# Patient Record
Sex: FEMALE | Race: WHITE | NOT HISPANIC OR LATINO | Employment: FULL TIME | URBAN - NONMETROPOLITAN AREA
[De-identification: names, ages, dates, MRNs, and addresses within clinical notes are randomized per-mention and may not be internally consistent; named-entity substitution may affect disease eponyms.]

---

## 2017-06-08 ENCOUNTER — APPOINTMENT (OUTPATIENT)
Dept: ULTRASOUND IMAGING | Facility: HOSPITAL | Age: 27
End: 2017-06-08

## 2017-06-08 ENCOUNTER — ANESTHESIA (OUTPATIENT)
Dept: PERIOP | Facility: HOSPITAL | Age: 27
End: 2017-06-08

## 2017-06-08 ENCOUNTER — HOSPITAL ENCOUNTER (OUTPATIENT)
Facility: HOSPITAL | Age: 27
Discharge: HOME | End: 2017-06-08
Attending: EMERGENCY MEDICINE | Admitting: SURGERY

## 2017-06-08 ENCOUNTER — APPOINTMENT (OUTPATIENT)
Dept: MRI IMAGING | Facility: HOSPITAL | Age: 27
End: 2017-06-08

## 2017-06-08 ENCOUNTER — ANESTHESIA EVENT (OUTPATIENT)
Dept: PERIOP | Facility: HOSPITAL | Age: 27
End: 2017-06-08

## 2017-06-08 VITALS
BODY MASS INDEX: 20.9 KG/M2 | OXYGEN SATURATION: 96 % | HEART RATE: 81 BPM | SYSTOLIC BLOOD PRESSURE: 111 MMHG | WEIGHT: 117.95 LBS | HEIGHT: 63 IN | TEMPERATURE: 98.2 F | RESPIRATION RATE: 20 BRPM | DIASTOLIC BLOOD PRESSURE: 58 MMHG

## 2017-06-08 DIAGNOSIS — Z3A.01 LESS THAN 8 WEEKS GESTATION OF PREGNANCY: ICD-10-CM

## 2017-06-08 DIAGNOSIS — O00.109 TUBAL PREGNANCY WITHOUT INTRAUTERINE PREGNANCY: ICD-10-CM

## 2017-06-08 DIAGNOSIS — G89.18 POSTOPERATIVE PAIN: ICD-10-CM

## 2017-06-08 DIAGNOSIS — K35.30 ACUTE APPENDICITIS WITH LOCALIZED PERITONITIS: Primary | ICD-10-CM

## 2017-06-08 LAB
ABO GROUP BLD: NORMAL
ALBUMIN SERPL-MCNC: 4.5 G/DL (ref 3.4–4.8)
ALBUMIN/GLOB SERPL: 1.7 G/DL (ref 1.1–1.8)
ALP SERPL-CCNC: 55 U/L (ref 38–126)
ALT SERPL W P-5'-P-CCNC: 25 U/L (ref 9–52)
ANION GAP SERPL CALCULATED.3IONS-SCNC: 12 MMOL/L (ref 5–15)
AST SERPL-CCNC: 23 U/L (ref 14–36)
B-HCG UR QL: POSITIVE
BACTERIA UR QL AUTO: ABNORMAL /HPF
BASOPHILS # BLD AUTO: 0.02 10*3/MM3 (ref 0–0.2)
BASOPHILS NFR BLD AUTO: 0.1 % (ref 0–2)
BILIRUB SERPL-MCNC: 0.4 MG/DL (ref 0.2–1.3)
BILIRUB UR QL STRIP: NEGATIVE
BUN BLD-MCNC: 8 MG/DL (ref 7–21)
BUN/CREAT SERPL: 14.5 (ref 7–25)
CALCIUM SPEC-SCNC: 8.8 MG/DL (ref 8.4–10.2)
CANDIDA ALBICANS: NEGATIVE
CHLORIDE SERPL-SCNC: 103 MMOL/L (ref 95–110)
CLARITY UR: ABNORMAL
CO2 SERPL-SCNC: 24 MMOL/L (ref 22–31)
COLOR UR: YELLOW
CREAT BLD-MCNC: 0.55 MG/DL (ref 0.5–1)
DEPRECATED RDW RBC AUTO: 40.7 FL (ref 36.4–46.3)
EOSINOPHIL # BLD AUTO: 0.14 10*3/MM3 (ref 0–0.7)
EOSINOPHIL NFR BLD AUTO: 1 % (ref 0–7)
ERYTHROCYTE [DISTWIDTH] IN BLOOD BY AUTOMATED COUNT: 12.1 % (ref 11.5–14.5)
GARDNERELLA VAGINALIS: NEGATIVE
GFR SERPL CREATININE-BSD FRML MDRD: 132 ML/MIN/1.73 (ref 60–165)
GLOBULIN UR ELPH-MCNC: 2.6 GM/DL (ref 2.3–3.5)
GLUCOSE BLD-MCNC: 104 MG/DL (ref 60–100)
GLUCOSE UR STRIP-MCNC: NEGATIVE MG/DL
HCG INTACT+B SERPL-ACNC: 469.18 MIU/ML
HCT VFR BLD AUTO: 35.4 % (ref 35–45)
HGB BLD-MCNC: 12.3 G/DL (ref 12–15.5)
HGB UR QL STRIP.AUTO: NEGATIVE
HOLD SPECIMEN: NORMAL
HOLD SPECIMEN: NORMAL
HYALINE CASTS UR QL AUTO: ABNORMAL /LPF
IMM GRANULOCYTES # BLD: 0.03 10*3/MM3 (ref 0–0.02)
IMM GRANULOCYTES NFR BLD: 0.2 % (ref 0–0.5)
KETONES UR QL STRIP: NEGATIVE
LEUKOCYTE ESTERASE UR QL STRIP.AUTO: ABNORMAL
LIPASE SERPL-CCNC: 53 U/L (ref 23–300)
LYMPHOCYTES # BLD AUTO: 2.3 10*3/MM3 (ref 0.6–4.2)
LYMPHOCYTES NFR BLD AUTO: 17.2 % (ref 10–50)
MCH RBC QN AUTO: 31.8 PG (ref 26.5–34)
MCHC RBC AUTO-ENTMCNC: 34.7 G/DL (ref 31.4–36)
MCV RBC AUTO: 91.5 FL (ref 80–98)
MONOCYTES # BLD AUTO: 1.02 10*3/MM3 (ref 0–0.9)
MONOCYTES NFR BLD AUTO: 7.6 % (ref 0–12)
NEUTROPHILS # BLD AUTO: 9.87 10*3/MM3 (ref 2–8.6)
NEUTROPHILS NFR BLD AUTO: 73.9 % (ref 37–80)
NITRITE UR QL STRIP: NEGATIVE
PH UR STRIP.AUTO: 5.5 [PH] (ref 5–9)
PLATELET # BLD AUTO: 222 10*3/MM3 (ref 150–450)
PMV BLD AUTO: 10.6 FL (ref 8–12)
POTASSIUM BLD-SCNC: 3.2 MMOL/L (ref 3.5–5.1)
PROT SERPL-MCNC: 7.1 G/DL (ref 6.3–8.6)
PROT UR QL STRIP: NEGATIVE
RBC # BLD AUTO: 3.87 10*6/MM3 (ref 3.77–5.16)
RBC # UR: ABNORMAL /HPF
REF LAB TEST METHOD: ABNORMAL
RH BLD: POSITIVE
SODIUM BLD-SCNC: 139 MMOL/L (ref 137–145)
SP GR UR STRIP: 1 (ref 1–1.03)
SQUAMOUS #/AREA URNS HPF: ABNORMAL /HPF
TRICHOMONAS VAGINALIS PCR: NEGATIVE
UROBILINOGEN UR QL STRIP: ABNORMAL
WBC NRBC COR # BLD: 13.38 10*3/MM3 (ref 3.2–9.8)
WBC UR QL AUTO: ABNORMAL /HPF
WHOLE BLOOD HOLD SPECIMEN: NORMAL
WHOLE BLOOD HOLD SPECIMEN: NORMAL

## 2017-06-08 PROCEDURE — G0378 HOSPITAL OBSERVATION PER HR: HCPCS

## 2017-06-08 PROCEDURE — 25010000002 MORPHINE PER 10 MG: Performed by: EMERGENCY MEDICINE

## 2017-06-08 PROCEDURE — 87491 CHLMYD TRACH DNA AMP PROBE: CPT | Performed by: EMERGENCY MEDICINE

## 2017-06-08 PROCEDURE — 25010000002 HYDROMORPHONE PER 4 MG: Performed by: NURSE ANESTHETIST, CERTIFIED REGISTERED

## 2017-06-08 PROCEDURE — 93010 ELECTROCARDIOGRAM REPORT: CPT | Performed by: INTERNAL MEDICINE

## 2017-06-08 PROCEDURE — 87510 GARDNER VAG DNA DIR PROBE: CPT | Performed by: EMERGENCY MEDICINE

## 2017-06-08 PROCEDURE — 88302 TISSUE EXAM BY PATHOLOGIST: CPT | Performed by: OBSTETRICS & GYNECOLOGY

## 2017-06-08 PROCEDURE — 80053 COMPREHEN METABOLIC PANEL: CPT | Performed by: EMERGENCY MEDICINE

## 2017-06-08 PROCEDURE — 59151 TREAT ECTOPIC PREGNANCY: CPT | Performed by: PHYSICIAN ASSISTANT

## 2017-06-08 PROCEDURE — 87591 N.GONORRHOEAE DNA AMP PROB: CPT | Performed by: EMERGENCY MEDICINE

## 2017-06-08 PROCEDURE — 25010000002 SUCCINYLCHOLINE PER 20 MG: Performed by: NURSE ANESTHETIST, CERTIFIED REGISTERED

## 2017-06-08 PROCEDURE — 59151 TREAT ECTOPIC PREGNANCY: CPT | Performed by: OBSTETRICS & GYNECOLOGY

## 2017-06-08 PROCEDURE — 88305 TISSUE EXAM BY PATHOLOGIST: CPT | Performed by: OBSTETRICS & GYNECOLOGY

## 2017-06-08 PROCEDURE — 96375 TX/PRO/DX INJ NEW DRUG ADDON: CPT

## 2017-06-08 PROCEDURE — 93976 VASCULAR STUDY: CPT

## 2017-06-08 PROCEDURE — 87086 URINE CULTURE/COLONY COUNT: CPT | Performed by: EMERGENCY MEDICINE

## 2017-06-08 PROCEDURE — 81001 URINALYSIS AUTO W/SCOPE: CPT | Performed by: EMERGENCY MEDICINE

## 2017-06-08 PROCEDURE — 93005 ELECTROCARDIOGRAM TRACING: CPT | Performed by: SURGERY

## 2017-06-08 PROCEDURE — 84702 CHORIONIC GONADOTROPIN TEST: CPT | Performed by: EMERGENCY MEDICINE

## 2017-06-08 PROCEDURE — 25010000002 DEXAMETHASONE PER 1 MG: Performed by: NURSE ANESTHETIST, CERTIFIED REGISTERED

## 2017-06-08 PROCEDURE — 25010000002 PROPOFOL 10 MG/ML EMULSION: Performed by: NURSE ANESTHETIST, CERTIFIED REGISTERED

## 2017-06-08 PROCEDURE — 25010000002 ONDANSETRON PER 1 MG: Performed by: NURSE ANESTHETIST, CERTIFIED REGISTERED

## 2017-06-08 PROCEDURE — 25010000002 NEOSTIGMINE PER 0.5 MG: Performed by: NURSE ANESTHETIST, CERTIFIED REGISTERED

## 2017-06-08 PROCEDURE — 25010000002 FENTANYL CITRATE (PF) 100 MCG/2ML SOLUTION: Performed by: NURSE ANESTHETIST, CERTIFIED REGISTERED

## 2017-06-08 PROCEDURE — 25010000002 LEVOFLOXACIN PER 250 MG: Performed by: EMERGENCY MEDICINE

## 2017-06-08 PROCEDURE — 87480 CANDIDA DNA DIR PROBE: CPT | Performed by: EMERGENCY MEDICINE

## 2017-06-08 PROCEDURE — 96366 THER/PROPH/DIAG IV INF ADDON: CPT

## 2017-06-08 PROCEDURE — 86900 BLOOD TYPING SEROLOGIC ABO: CPT | Performed by: EMERGENCY MEDICINE

## 2017-06-08 PROCEDURE — 25010000002 ONDANSETRON PER 1 MG: Performed by: OBSTETRICS & GYNECOLOGY

## 2017-06-08 PROCEDURE — 25010000002 DIPHENHYDRAMINE PER 50 MG: Performed by: EMERGENCY MEDICINE

## 2017-06-08 PROCEDURE — 81025 URINE PREGNANCY TEST: CPT | Performed by: EMERGENCY MEDICINE

## 2017-06-08 PROCEDURE — 85025 COMPLETE CBC W/AUTO DIFF WBC: CPT | Performed by: EMERGENCY MEDICINE

## 2017-06-08 PROCEDURE — 96361 HYDRATE IV INFUSION ADD-ON: CPT

## 2017-06-08 PROCEDURE — 76801 OB US < 14 WKS SINGLE FETUS: CPT

## 2017-06-08 PROCEDURE — 99285 EMERGENCY DEPT VISIT HI MDM: CPT

## 2017-06-08 PROCEDURE — 74181 MRI ABDOMEN W/O CONTRAST: CPT

## 2017-06-08 PROCEDURE — 36415 COLL VENOUS BLD VENIPUNCTURE: CPT

## 2017-06-08 PROCEDURE — 83690 ASSAY OF LIPASE: CPT | Performed by: EMERGENCY MEDICINE

## 2017-06-08 PROCEDURE — 25010000002 ONDANSETRON PER 1 MG: Performed by: EMERGENCY MEDICINE

## 2017-06-08 PROCEDURE — 96365 THER/PROPH/DIAG IV INF INIT: CPT

## 2017-06-08 PROCEDURE — 25010000002 KETOROLAC TROMETHAMINE PER 15 MG: Performed by: OBSTETRICS & GYNECOLOGY

## 2017-06-08 PROCEDURE — 86901 BLOOD TYPING SEROLOGIC RH(D): CPT | Performed by: EMERGENCY MEDICINE

## 2017-06-08 PROCEDURE — 72195 MRI PELVIS W/O DYE: CPT

## 2017-06-08 PROCEDURE — 87660 TRICHOMONAS VAGIN DIR PROBE: CPT | Performed by: EMERGENCY MEDICINE

## 2017-06-08 PROCEDURE — 96367 TX/PROPH/DG ADDL SEQ IV INF: CPT

## 2017-06-08 PROCEDURE — 49320 DIAG LAPARO SEPARATE PROC: CPT | Performed by: SURGERY

## 2017-06-08 DEVICE — CLIP FALLOP FILSHIE TI PR STRL BX/20: Type: IMPLANTABLE DEVICE | Status: FUNCTIONAL

## 2017-06-08 RX ORDER — DOCUSATE SODIUM 100 MG/1
100 CAPSULE, LIQUID FILLED ORAL ONCE
Status: DISCONTINUED | OUTPATIENT
Start: 2017-06-08 | End: 2017-06-08 | Stop reason: HOSPADM

## 2017-06-08 RX ORDER — SENNA PLUS 8.6 MG/1
1 TABLET ORAL ONCE
Status: DISCONTINUED | OUTPATIENT
Start: 2017-06-08 | End: 2017-06-08 | Stop reason: HOSPADM

## 2017-06-08 RX ORDER — FLUMAZENIL 0.1 MG/ML
0.2 INJECTION INTRAVENOUS AS NEEDED
Status: DISCONTINUED | OUTPATIENT
Start: 2017-06-08 | End: 2017-06-08 | Stop reason: HOSPADM

## 2017-06-08 RX ORDER — PROMETHAZINE HYDROCHLORIDE 25 MG/1
25 TABLET ORAL EVERY 6 HOURS PRN
Qty: 60 TABLET | Refills: 11 | Status: SHIPPED | OUTPATIENT
Start: 2017-06-08 | End: 2018-04-30

## 2017-06-08 RX ORDER — IBUPROFEN 800 MG/1
800 TABLET ORAL EVERY 8 HOURS PRN
Qty: 60 TABLET | Refills: 12 | Status: SHIPPED | OUTPATIENT
Start: 2017-06-08 | End: 2017-06-15

## 2017-06-08 RX ORDER — ONDANSETRON 8 MG/1
8 TABLET, ORALLY DISINTEGRATING ORAL DAILY
Qty: 30 TABLET | Refills: 11 | Status: SHIPPED | OUTPATIENT
Start: 2017-06-08 | End: 2018-04-30

## 2017-06-08 RX ORDER — LEVOFLOXACIN 5 MG/ML
750 INJECTION, SOLUTION INTRAVENOUS ONCE
Status: COMPLETED | OUTPATIENT
Start: 2017-06-08 | End: 2017-06-08

## 2017-06-08 RX ORDER — SODIUM CHLORIDE 0.9 % (FLUSH) 0.9 %
10 SYRINGE (ML) INJECTION AS NEEDED
Status: DISCONTINUED | OUTPATIENT
Start: 2017-06-08 | End: 2017-06-08 | Stop reason: HOSPADM

## 2017-06-08 RX ORDER — ONDANSETRON 2 MG/ML
4 INJECTION INTRAMUSCULAR; INTRAVENOUS ONCE AS NEEDED
Status: COMPLETED | OUTPATIENT
Start: 2017-06-08 | End: 2017-06-08

## 2017-06-08 RX ORDER — PROMETHAZINE HYDROCHLORIDE 25 MG/ML
12.5 INJECTION, SOLUTION INTRAMUSCULAR; INTRAVENOUS ONCE AS NEEDED
Status: DISCONTINUED | OUTPATIENT
Start: 2017-06-08 | End: 2017-06-08 | Stop reason: HOSPADM

## 2017-06-08 RX ORDER — DIPHENHYDRAMINE HYDROCHLORIDE 50 MG/ML
25 INJECTION INTRAMUSCULAR; INTRAVENOUS ONCE
Status: COMPLETED | OUTPATIENT
Start: 2017-06-08 | End: 2017-06-08

## 2017-06-08 RX ORDER — DEXAMETHASONE SODIUM PHOSPHATE 4 MG/ML
INJECTION, SOLUTION INTRA-ARTICULAR; INTRALESIONAL; INTRAMUSCULAR; INTRAVENOUS; SOFT TISSUE AS NEEDED
Status: DISCONTINUED | OUTPATIENT
Start: 2017-06-08 | End: 2017-06-08 | Stop reason: SURG

## 2017-06-08 RX ORDER — SODIUM CHLORIDE, SODIUM GLUCONATE, SODIUM ACETATE, POTASSIUM CHLORIDE, AND MAGNESIUM CHLORIDE 526; 502; 368; 37; 30 MG/100ML; MG/100ML; MG/100ML; MG/100ML; MG/100ML
INJECTION, SOLUTION INTRAVENOUS CONTINUOUS PRN
Status: DISCONTINUED | OUTPATIENT
Start: 2017-06-08 | End: 2017-06-08 | Stop reason: SURG

## 2017-06-08 RX ORDER — PROMETHAZINE HYDROCHLORIDE 12.5 MG/1
12.5 TABLET ORAL ONCE AS NEEDED
Status: DISCONTINUED | OUTPATIENT
Start: 2017-06-08 | End: 2017-06-08 | Stop reason: HOSPADM

## 2017-06-08 RX ORDER — DIPHENHYDRAMINE HYDROCHLORIDE 50 MG/ML
12.5 INJECTION INTRAMUSCULAR; INTRAVENOUS
Status: DISCONTINUED | OUTPATIENT
Start: 2017-06-08 | End: 2017-06-08 | Stop reason: HOSPADM

## 2017-06-08 RX ORDER — KETOROLAC TROMETHAMINE 15 MG/ML
60 INJECTION, SOLUTION INTRAMUSCULAR; INTRAVENOUS ONCE
Status: DISCONTINUED | OUTPATIENT
Start: 2017-06-08 | End: 2017-06-08

## 2017-06-08 RX ORDER — LIDOCAINE HYDROCHLORIDE 20 MG/ML
INJECTION, SOLUTION INFILTRATION; PERINEURAL AS NEEDED
Status: DISCONTINUED | OUTPATIENT
Start: 2017-06-08 | End: 2017-06-08 | Stop reason: SURG

## 2017-06-08 RX ORDER — HYDROCODONE BITARTRATE AND ACETAMINOPHEN 5; 325 MG/1; MG/1
1 TABLET ORAL EVERY 6 HOURS PRN
Qty: 40 TABLET | Refills: 0 | Status: SHIPPED | OUTPATIENT
Start: 2017-06-08 | End: 2018-04-30

## 2017-06-08 RX ORDER — SUCCINYLCHOLINE CHLORIDE 20 MG/ML
INJECTION INTRAMUSCULAR; INTRAVENOUS AS NEEDED
Status: DISCONTINUED | OUTPATIENT
Start: 2017-06-08 | End: 2017-06-08 | Stop reason: SURG

## 2017-06-08 RX ORDER — ACETAMINOPHEN 325 MG/1
650 TABLET ORAL ONCE
Status: DISCONTINUED | OUTPATIENT
Start: 2017-06-08 | End: 2017-06-08 | Stop reason: HOSPADM

## 2017-06-08 RX ORDER — IBUPROFEN 800 MG/1
800 TABLET ORAL EVERY 8 HOURS PRN
Qty: 60 TABLET | Refills: 12 | Status: SHIPPED | OUTPATIENT
Start: 2017-06-08 | End: 2017-10-16

## 2017-06-08 RX ORDER — ONDANSETRON 2 MG/ML
4 INJECTION INTRAMUSCULAR; INTRAVENOUS ONCE
Status: COMPLETED | OUTPATIENT
Start: 2017-06-08 | End: 2017-06-08

## 2017-06-08 RX ORDER — FENTANYL CITRATE 50 UG/ML
INJECTION, SOLUTION INTRAMUSCULAR; INTRAVENOUS AS NEEDED
Status: DISCONTINUED | OUTPATIENT
Start: 2017-06-08 | End: 2017-06-08 | Stop reason: SURG

## 2017-06-08 RX ORDER — ROCURONIUM BROMIDE 10 MG/ML
INJECTION, SOLUTION INTRAVENOUS AS NEEDED
Status: DISCONTINUED | OUTPATIENT
Start: 2017-06-08 | End: 2017-06-08 | Stop reason: SURG

## 2017-06-08 RX ORDER — NALOXONE HCL 0.4 MG/ML
0.2 VIAL (ML) INJECTION AS NEEDED
Status: DISCONTINUED | OUTPATIENT
Start: 2017-06-08 | End: 2017-06-08 | Stop reason: HOSPADM

## 2017-06-08 RX ORDER — ONDANSETRON 4 MG/1
4 TABLET, FILM COATED ORAL ONCE AS NEEDED
Status: DISCONTINUED | OUTPATIENT
Start: 2017-06-08 | End: 2017-06-08 | Stop reason: HOSPADM

## 2017-06-08 RX ORDER — HYDROCODONE BITARTRATE AND ACETAMINOPHEN 7.5; 325 MG/1; MG/1
1 TABLET ORAL ONCE AS NEEDED
Status: DISCONTINUED | OUTPATIENT
Start: 2017-06-08 | End: 2017-06-08 | Stop reason: HOSPADM

## 2017-06-08 RX ORDER — ONDANSETRON 2 MG/ML
INJECTION INTRAMUSCULAR; INTRAVENOUS AS NEEDED
Status: DISCONTINUED | OUTPATIENT
Start: 2017-06-08 | End: 2017-06-08 | Stop reason: SURG

## 2017-06-08 RX ORDER — KETOROLAC TROMETHAMINE 30 MG/ML
60 INJECTION, SOLUTION INTRAMUSCULAR; INTRAVENOUS ONCE
Status: COMPLETED | OUTPATIENT
Start: 2017-06-08 | End: 2017-06-08

## 2017-06-08 RX ORDER — SODIUM CHLORIDE 9 MG/ML
125 INJECTION, SOLUTION INTRAVENOUS CONTINUOUS
Status: DISCONTINUED | OUTPATIENT
Start: 2017-06-08 | End: 2017-06-08 | Stop reason: HOSPADM

## 2017-06-08 RX ORDER — ACETAMINOPHEN 325 MG/1
650 TABLET ORAL ONCE AS NEEDED
Status: DISCONTINUED | OUTPATIENT
Start: 2017-06-08 | End: 2017-06-08 | Stop reason: HOSPADM

## 2017-06-08 RX ORDER — GLYCOPYRROLATE 0.2 MG/ML
INJECTION INTRAMUSCULAR; INTRAVENOUS AS NEEDED
Status: DISCONTINUED | OUTPATIENT
Start: 2017-06-08 | End: 2017-06-08 | Stop reason: SURG

## 2017-06-08 RX ORDER — PROPOFOL 10 MG/ML
VIAL (ML) INTRAVENOUS AS NEEDED
Status: DISCONTINUED | OUTPATIENT
Start: 2017-06-08 | End: 2017-06-08 | Stop reason: SURG

## 2017-06-08 RX ORDER — LABETALOL HYDROCHLORIDE 5 MG/ML
5 INJECTION, SOLUTION INTRAVENOUS
Status: DISCONTINUED | OUTPATIENT
Start: 2017-06-08 | End: 2017-06-08 | Stop reason: HOSPADM

## 2017-06-08 RX ORDER — ACETAMINOPHEN 650 MG/1
650 SUPPOSITORY RECTAL ONCE AS NEEDED
Status: DISCONTINUED | OUTPATIENT
Start: 2017-06-08 | End: 2017-06-08 | Stop reason: HOSPADM

## 2017-06-08 RX ORDER — ACETAMINOPHEN 325 MG/1
650 TABLET ORAL ONCE
Status: COMPLETED | OUTPATIENT
Start: 2017-06-08 | End: 2017-06-08

## 2017-06-08 RX ORDER — EPHEDRINE SULFATE 50 MG/ML
5 INJECTION, SOLUTION INTRAVENOUS ONCE AS NEEDED
Status: DISCONTINUED | OUTPATIENT
Start: 2017-06-08 | End: 2017-06-08 | Stop reason: HOSPADM

## 2017-06-08 RX ORDER — ONDANSETRON 2 MG/ML
4 INJECTION INTRAMUSCULAR; INTRAVENOUS ONCE AS NEEDED
Status: DISCONTINUED | OUTPATIENT
Start: 2017-06-08 | End: 2017-06-08 | Stop reason: HOSPADM

## 2017-06-08 RX ORDER — MORPHINE SULFATE 4 MG/ML
4 INJECTION, SOLUTION INTRAMUSCULAR; INTRAVENOUS ONCE
Status: COMPLETED | OUTPATIENT
Start: 2017-06-08 | End: 2017-06-08

## 2017-06-08 RX ADMIN — SODIUM CHLORIDE 125 ML/HR: 900 INJECTION, SOLUTION INTRAVENOUS at 06:35

## 2017-06-08 RX ADMIN — DEXAMETHASONE SODIUM PHOSPHATE 4 MG: 4 INJECTION, SOLUTION INTRAMUSCULAR; INTRAVENOUS at 15:52

## 2017-06-08 RX ADMIN — LIDOCAINE HYDROCHLORIDE 60 MG: 20 INJECTION, SOLUTION INFILTRATION; PERINEURAL at 14:01

## 2017-06-08 RX ADMIN — SUCCINYLCHOLINE CHLORIDE 140 MG: 20 INJECTION, SOLUTION INTRAMUSCULAR; INTRAVENOUS at 14:01

## 2017-06-08 RX ADMIN — KETOROLAC TROMETHAMINE 60 MG: 60 INJECTION, SOLUTION INTRAMUSCULAR at 17:16

## 2017-06-08 RX ADMIN — HYDROMORPHONE HYDROCHLORIDE 0.5 MG: 1 INJECTION, SOLUTION INTRAMUSCULAR; INTRAVENOUS; SUBCUTANEOUS at 16:27

## 2017-06-08 RX ADMIN — GLYCOPYRROLATE 0.4 MG: 0.2 INJECTION, SOLUTION INTRAMUSCULAR; INTRAVENOUS at 15:56

## 2017-06-08 RX ADMIN — SODIUM CHLORIDE, SODIUM GLUCONATE, SODIUM ACETATE, POTASSIUM CHLORIDE, AND MAGNESIUM CHLORIDE: 526; 502; 368; 37; 30 INJECTION, SOLUTION INTRAVENOUS at 15:00

## 2017-06-08 RX ADMIN — FENTANYL CITRATE 50 MCG: 50 INJECTION, SOLUTION INTRAMUSCULAR; INTRAVENOUS at 14:38

## 2017-06-08 RX ADMIN — ROCURONIUM BROMIDE 20 MG: 10 INJECTION INTRAVENOUS at 14:21

## 2017-06-08 RX ADMIN — LEVOFLOXACIN 750 MG: 5 INJECTION, SOLUTION INTRAVENOUS at 06:36

## 2017-06-08 RX ADMIN — DIPHENHYDRAMINE HYDROCHLORIDE 25 MG: 50 INJECTION INTRAMUSCULAR; INTRAVENOUS at 05:22

## 2017-06-08 RX ADMIN — FENTANYL CITRATE 50 MCG: 50 INJECTION, SOLUTION INTRAMUSCULAR; INTRAVENOUS at 15:39

## 2017-06-08 RX ADMIN — SODIUM CHLORIDE, SODIUM GLUCONATE, SODIUM ACETATE, POTASSIUM CHLORIDE, AND MAGNESIUM CHLORIDE: 526; 502; 368; 37; 30 INJECTION, SOLUTION INTRAVENOUS at 14:22

## 2017-06-08 RX ADMIN — Medication 10 ML: at 02:24

## 2017-06-08 RX ADMIN — FENTANYL CITRATE 100 MCG: 50 INJECTION, SOLUTION INTRAMUSCULAR; INTRAVENOUS at 14:00

## 2017-06-08 RX ADMIN — METRONIDAZOLE 500 MG: 500 INJECTION, SOLUTION INTRAVENOUS at 10:59

## 2017-06-08 RX ADMIN — PROPOFOL 150 MG: 10 INJECTION, EMULSION INTRAVENOUS at 14:00

## 2017-06-08 RX ADMIN — SODIUM CHLORIDE 500 ML: 9 INJECTION, SOLUTION INTRAVENOUS at 03:30

## 2017-06-08 RX ADMIN — ONDANSETRON 4 MG: 2 INJECTION INTRAMUSCULAR; INTRAVENOUS at 15:52

## 2017-06-08 RX ADMIN — ONDANSETRON 4 MG: 2 INJECTION INTRAMUSCULAR; INTRAVENOUS at 04:45

## 2017-06-08 RX ADMIN — HYDROMORPHONE HYDROCHLORIDE 0.5 MG: 1 INJECTION, SOLUTION INTRAMUSCULAR; INTRAVENOUS; SUBCUTANEOUS at 16:48

## 2017-06-08 RX ADMIN — NEOSTIGMINE METHYLSULFATE 2 MG: 1 INJECTION, SOLUTION INTRAMUSCULAR; INTRAVENOUS; SUBCUTANEOUS at 15:56

## 2017-06-08 RX ADMIN — ACETAMINOPHEN 650 MG: 325 TABLET ORAL at 02:30

## 2017-06-08 RX ADMIN — MORPHINE SULFATE 4 MG: 4 INJECTION, SOLUTION INTRAMUSCULAR; INTRAVENOUS at 04:45

## 2017-06-08 RX ADMIN — ONDANSETRON 4 MG: 2 INJECTION INTRAMUSCULAR; INTRAVENOUS at 18:31

## 2017-06-08 NOTE — OP NOTE
OPERATIVE NOTE  Shelley Rico  1990  6/8/2017    PREOP DIAGNOSES:  Acute appendicitis with localized peritonitis [K35.3]  Possible ectopic    POSTOP DIAGNOSES:  Normal Appendix  Right ectopic after tubal sterilization    PROCEDURE:       Procedure(s):  LAPAROSCOPIC EXPLORATION FOR ECTOPIC PREGNANCY  BILATERAL SALPINGECTOMY LAPAROSCOPIC    SURGEON: Duy Jaramillo MD, FACOG                        Franky Peters MD, General Surgery      ASSISTANT: Lesly Varela CSA    STAFF:   Circulator: Sue Pickard; Sarah Sheehan RN  Scrub Person: Janis England  Assistant: Tierra Cordoba CSA; Aiyana Varela CSA    ANESTHESIA: * No anesthesia type entered *    ANESTHESIA STAFF:  Anesthesiologist: Jameel Lindsay MD  CRNA: Luis Marlow CRNA    ESTIMATED BLOOD LOSS: 100 ml     FINDINGS: Normal appendix.  Blood in pelvis.  Ruptured ectopic pregnancy and right fallopian tube.  Adhesions of left fallopian tube to omentum.    COMPLICATIONS: none    DESCRIPTION OF OPERATION:   Dr. Peters began the surgery.  Patient was identified and brought to the operating room.  She underwent general endotracheal anesthesia per anesthesia protocol.  She was placed in the supine position and prepped and draped in the usual sterile fashion.  A Myles catheter was inserted.  A 5 mm incision was made in the infraumbilical region and the fascia was entered sharply.  A 5 mm trocar was inserted using the open scope technique.  The camera verified correct placement, and the abdomen was insufflated with CO2.  A second 5 mm trocar was inserted in the suprapubic region.  This trocar was inserted under direct visualization.  Dr. Peters found the patient to have a hemoperitoneum, ruptured right ectopic pregnancy, and normal-appearing appendix.  He called me (consult to OB/GYN), at this time.    Blood was evacuated.  The infraumbilical port was replaced with a 12 mm laparoscopic port.  And a 5 mm port was inserted under direct  visualization in the left lower quadrant.  The right fallopian tube was grasped, adhesions to the anterior abdominal wall were lysed with the Harmonic scalpel.  The mesosalpinx salpinx and the tube itself near the cornua were resected with the Harmonic scalpel.  The ectopic and remainder of fallopian tube was removed through the umbilicus port.  Adhesions of the left fallopian tube to the omentum were lysed with the Harmonic scalpel.  In the left fallopian tube was removed and a similar fashion.  A Filshie clip was placed at the cornua on both fallopian tubes.  There was good hemostasis following this procedure.  The remaining hemoperitoneum was evacuated.  All instruments were removed, and the abdomen was deflated.  The 12 mm fascial sites was closed with 0 Vicryl.  The skin at each site was closed with 3-0 Monocryl.  Bandages were applied.  The patient was awakened from general anesthesia and transferred to the recovery room in stable condition.  Sponge lap and needle counts were correct.  There were no complications.          This document has been electronically signed by Duy Jaramillo MD on June 8, 2017 4:19 PM

## 2017-06-08 NOTE — ANESTHESIA PREPROCEDURE EVALUATION
Anesthesia Evaluation     Patient summary reviewed and Nursing notes reviewed   NPO Solid Status: > 8 hours  NPO Liquid Status: > 8 hours     Airway   Mallampati: II  TM distance: >3 FB  Neck ROM: full  no difficulty expected  Dental - normal exam     Pulmonary - normal exam   Cardiovascular - negative cardio ROS and normal exam        Neuro/Psych- negative ROS  GI/Hepatic/Renal/Endo      ROS Comment: For laparoscopic appendectomy today for acute appendicitis. She has RLQ pain, but no nausea nor vomiting.    Musculoskeletal     Abdominal    Substance History - negative use     OB/GYN      Comment: Has a positive HCG (469) with no intrauterine gestational sac seen on either US or MRI and she is s/p BTL during her last C/S in 2011.      Other - negative ROS                                     Anesthesia Plan    ASA 2 - emergent     general     intravenous induction   Anesthetic plan and risks discussed with patient and spouse/significant other.

## 2017-06-08 NOTE — H&P
Patient Care Team:  No Known Provider as PCP - General    Chief complaint:  RLQ abdominal pain    Subjective     Abdominal Pain   This is a new problem. The current episode started yesterday. The onset quality is gradual. The problem occurs constantly. The problem has been gradually worsening. The pain is located in the RUQ. The pain is moderate. The quality of the pain is colicky and sharp. The abdominal pain does not radiate. Pertinent negatives include no anorexia, arthralgias, belching, constipation, diarrhea, fever, headaches, hematochezia, hematuria, melena, myalgias, nausea or vomiting. Nothing aggravates the pain. The pain is relieved by nothing. Prior workup: MRI ( positive pregnancy test) Her past medical history is significant for abdominal surgery and gallstones. There is no history of colon cancer, Crohn's disease, GERD, irritable bowel syndrome, pancreatitis, PUD or ulcerative colitis.       Review of Systems   Constitutional: Negative for activity change, appetite change, chills, diaphoresis, fatigue, fever and unexpected weight change.   HENT: Negative for congestion, dental problem, drooling, ear discharge, ear pain, facial swelling, hearing loss and trouble swallowing.    Eyes: Negative for discharge and visual disturbance.   Respiratory: Negative for apnea and chest tightness.    Cardiovascular: Negative for chest pain, palpitations and leg swelling.   Gastrointestinal: Positive for abdominal pain. Negative for anorexia, constipation, diarrhea, hematochezia, melena, nausea, rectal pain and vomiting.   Endocrine: Negative for heat intolerance and polydipsia.   Genitourinary: Negative for difficulty urinating, dyspareunia, flank pain and hematuria.   Musculoskeletal: Negative for arthralgias, back pain and myalgias.   Neurological: Negative for dizziness and headaches.   Psychiatric/Behavioral: Negative for agitation, behavioral problems and confusion.        Past Medical History:   Diagnosis  Date   • Kidney stone      Past Surgical History:   Procedure Laterality Date   •  SECTION     • CHOLECYSTECTOMY     • TONSILLECTOMY     • TUBAL ABDOMINAL LIGATION       History reviewed. No pertinent family history.  Social History   Substance Use Topics   • Smoking status: Never Smoker   • Smokeless tobacco: None   • Alcohol use No       (Not in a hospital admission)  Allergies:  Morphine and related; Bactrim [sulfamethoxazole-trimethoprim]; and Penicillins    Objective      Vital Signs  Temp:  [98.3 °F (36.8 °C)] 98.3 °F (36.8 °C)  Heart Rate:  [] 84  Resp:  [18-20] 18  BP: ()/(54-75) 99/54    Physical Exam   Constitutional: She is oriented to person, place, and time. She appears well-developed and well-nourished.   HENT:   Head: Normocephalic and atraumatic.   Eyes: Conjunctivae are normal. Pupils are equal, round, and reactive to light.   Neck: Normal range of motion. Neck supple. No JVD present. No tracheal deviation present. No thyromegaly present.   Cardiovascular: Normal rate and regular rhythm.    Pulmonary/Chest: Effort normal and breath sounds normal.   Abdominal: Soft. Bowel sounds are normal. She exhibits no distension and no mass. There is tenderness (RLQ peritonitis). There is no rebound and no guarding. No hernia.   Musculoskeletal: Normal range of motion. She exhibits no edema, tenderness or deformity.   Lymphadenopathy:     She has no cervical adenopathy.   Neurological: She is alert and oriented to person, place, and time.   Skin: Skin is warm and dry. No erythema. No pallor.   Psychiatric: She has a normal mood and affect. Her behavior is normal.   Vitals reviewed.      Results Review:   I reviewed the patient's new clinical results.      Assessment/Plan     Active Problems:    Acute appendicitis with localized peritonitis      Assessment:    Condition: In stable condition.  Worsening.   (RLQ abdominal pain- likely appendicitis  Positive pregnancy test- low titres of beta  HCG).     Plan:   (1. Laparoscopic possible open appendectomy    Risks of bleeding, infection, open operation explained. Understands and agrees.).       I discussed the patients findings and my recommendations with patient            This document has been electronically signed by Franky Peters MD on June 8, 2017 10:56 AM      Franky Peters MD  06/08/17  10:49 AM    Time: 10 minutes

## 2017-06-08 NOTE — PLAN OF CARE
Problem: Patient Care Overview (Adult)  Goal: Plan of Care Review  Outcome: Ongoing (interventions implemented as appropriate)    06/08/17 8460   Coping/Psychosocial Response Interventions   Plan Of Care Reviewed With patient   Patient Care Overview   Progress improving   Outcome Evaluation   Outcome Summary/Follow up Plan VSS, pt meets pacu d/c criteria       Goal: Adult Individualization and Mutuality  Outcome: Ongoing (interventions implemented as appropriate)    Problem: Perioperative Period (Adult)  Goal: Signs and Symptoms of Listed Potential Problems Will be Absent or Manageable (Perioperative Period)  Outcome: Ongoing (interventions implemented as appropriate)

## 2017-06-08 NOTE — ED PROVIDER NOTES
Subjective   HPI Comments: Patient presents with 2 days of RLQ pain.  Patient notes she is 10 days late for her period and is concerned for ectopic pregnancy.  Abdominal pain is diffuse across the lower quadrants with minimal pain in the upper quadrants.  No anorexia.  No weakness, no pallor.  No hx of STD, or concerns for current STD, no drainage.        History provided by:  Patient   used: No        Review of Systems   Constitutional: Negative.  Negative for appetite change, chills and fever.   HENT: Negative.  Negative for congestion.    Eyes: Negative.  Negative for photophobia and visual disturbance.   Respiratory: Negative.  Negative for cough, chest tightness and shortness of breath.    Cardiovascular: Negative.  Negative for chest pain and palpitations.   Gastrointestinal: Positive for abdominal pain. Negative for constipation, diarrhea, nausea and vomiting.   Endocrine: Negative.    Genitourinary: Negative.  Negative for decreased urine volume, dysuria, flank pain and hematuria.   Musculoskeletal: Negative.  Negative for arthralgias, back pain, myalgias, neck pain and neck stiffness.   Skin: Negative.  Negative for pallor.   Neurological: Negative.  Negative for dizziness, syncope, weakness, light-headedness, numbness and headaches.   Psychiatric/Behavioral: Negative.  Negative for confusion and suicidal ideas. The patient is not nervous/anxious.    All other systems reviewed and are negative.      Past Medical History:   Diagnosis Date   • Kidney stone        Allergies   Allergen Reactions   • Morphine And Related Shortness Of Breath   • Bactrim [Sulfamethoxazole-Trimethoprim]    • Penicillins        Past Surgical History:   Procedure Laterality Date   •  SECTION     • CHOLECYSTECTOMY     • TONSILLECTOMY     • TUBAL ABDOMINAL LIGATION         History reviewed. No pertinent family history.    Social History     Social History   • Marital status:      Spouse name: N/A   •  Number of children: N/A   • Years of education: N/A     Social History Main Topics   • Smoking status: Never Smoker   • Smokeless tobacco: None   • Alcohol use No   • Drug use: No   • Sexual activity: Yes     Other Topics Concern   • None     Social History Narrative   • None           Objective   Physical Exam   Constitutional: She is oriented to person, place, and time. She appears well-developed and well-nourished. No distress.   HENT:   Head: Normocephalic and atraumatic.   Nose: Nose normal.   Mouth/Throat: Oropharynx is clear and moist.   Eyes: Conjunctivae and EOM are normal. No scleral icterus.   Neck: Normal range of motion. Neck supple. No JVD present.   Cardiovascular: Normal rate, regular rhythm, normal heart sounds and intact distal pulses.  Exam reveals no gallop and no friction rub.    No murmur heard.  Pulmonary/Chest: Effort normal. No respiratory distress. She has no wheezes. She has no rales. She exhibits no tenderness.   Abdominal: Soft. She exhibits no distension and no mass. There is tenderness. There is rebound (RLQ, LLQ) and guarding (RLQ and suprapubic).   Genitourinary: Rectum normal. Pelvic exam was performed with patient prone. There is no rash, tenderness, lesion or injury on the right labia. There is no rash, tenderness, lesion or injury on the left labia. Uterus is tender. Cervix exhibits motion tenderness. Cervix exhibits no discharge and no friability. Right adnexum displays tenderness. Left adnexum displays no tenderness. There is tenderness in the vagina. Vaginal discharge (clear to cloudy fluid in the vaginal vault) found.   Musculoskeletal: Normal range of motion. She exhibits no edema, tenderness or deformity.   Lymphadenopathy:     She has no cervical adenopathy.   Neurological: She is alert and oriented to person, place, and time. No cranial nerve deficit. She exhibits normal muscle tone.   Skin: Skin is warm and dry. No rash noted. She is not diaphoretic. No erythema. No  pallor.   Psychiatric: She has a normal mood and affect. Her behavior is normal. Judgment and thought content normal.   Nursing note and vitals reviewed.      Procedures         ED Course  ED Course   Comment By Time   Case discussed with Dr. Mina who recommends CT vrs. MRI. Blake Keen MD 06/08 0557   Patient given empiric abx for possible appy in the ED awaiting MRI.  Will consult OB/GYN vrs surgery as per MRI result. Blake Keen MD 06/08 0629   Dr. Peters paged.  D/w Dr. Jaramillo, OB/GYN. No further recommendations received. Ritchie Donovan MD 06/08 0929   Dr. Peters consulted. Ritchie Donovan MD 06/08 0962        Labs Reviewed   COMPREHENSIVE METABOLIC PANEL - Abnormal; Notable for the following:        Result Value    Glucose 104 (*)     Potassium 3.2 (*)     All other components within normal limits   URINALYSIS W/ CULTURE IF INDICATED - Abnormal; Notable for the following:     Appearance, UA Cloudy (*)     Leuk Esterase, UA Small (1+) (*)     All other components within normal limits   PREGNANCY, URINE - Abnormal; Notable for the following:     HCG, Urine QL Positive (*)     All other components within normal limits   HCG, QUANTITATIVE, PREGNANCY - Abnormal; Notable for the following:     HCG Quantitative 469.18 (*)     All other components within normal limits   CBC WITH AUTO DIFFERENTIAL - Abnormal; Notable for the following:     WBC 13.38 (*)     Neutrophils, Absolute 9.87 (*)     Monocytes, Absolute 1.02 (*)     Immature Grans, Absolute 0.03 (*)     All other components within normal limits   URINALYSIS, MICROSCOPIC ONLY - Abnormal; Notable for the following:     RBC, UA 0-2 (*)     Bacteria, UA 1+ (*)     Squamous Epithelial Cells, UA 3-5 (*)     All other components within normal limits   URINE CULTURE - Normal   LIPASE - Normal   HARSHAD ALBICANS, GARDNERELLA VAGINALIS, TRICHOMONAS VAGINALIS, PCR   CHLAMYDIA TRACHOMATIS, NEISSERIA GONORRHOEAE, PCR   RAINBOW DRAW    Narrative:     The following  orders were created for panel order [place] Draw.  Procedure                               Abnormality         Status                     ---------                               -----------         ------                     Light Blue Top[[id number]]                                    Final result               Green Top (Gel)[[id number]]                                   Final result               Lavender Top[[id number]]                                      Final result               Gold Top - SST[[id number]]                                    Final result                 Please view results for these tests on the individual orders.   ABO/RH   CBC AND DIFFERENTIAL    Narrative:     The following orders were created for panel order CBC & Differential.  Procedure                               Abnormality         Status                     ---------                               -----------         ------                     CBC Auto Differential[[id number]]         Abnormal            Final result                 Please view results for these tests on the individual orders.   LIGHT BLUE TOP   GREEN TOP   LAVENDER TOP   GOLD TOP - SST       MRI Abdomen Without Contrast   Final Result   CONCLUSION: Inflammatory process inferior to the cecal tip   suspicious for appendicitis.      Moderate amount of free fluid in the adnexa and cul-de-sac.      Normal size empty uterus. No evidence of any intrauterine   gestational sac. Simple 1 cm benign-appearing cyst right ovary.   Right ovary is otherwise unremarkable. Normal left ovary.      MRI abdomen, and pelvis otherwise unremarkable.      These findings discussed with ER physician at 9:10 AM.      Electronically signed by:  Calixto Watkins MD  6/8/2017 9:13 AM CDT   Workstation: [id number]      MRI Pelvis Without Contrast   Final Result   CONCLUSION: Inflammatory process inferior to the cecal tip   suspicious for appendicitis.      Moderate amount of free fluid in the adnexa and  cul-de-sac.      Normal size empty uterus. No evidence of any intrauterine   gestational sac. Simple 1 cm benign-appearing cyst right ovary.   Right ovary is otherwise unremarkable. Normal left ovary.      MRI abdomen, and pelvis otherwise unremarkable.      These findings discussed with ER physician at 9:10 AM.      Electronically signed by:  Calixto Watkins MD  6/8/2017 9:13 AM CDT   Workstation: [id number]      US Ob < 14 Weeks Single or First Gestation   Final Result   1. Essentially unremarkable examination with no intrauterine   pregnancy identified however one is not expected to be identified   with the patient's beta-hCG being so low. Clinical follow-up is   needed.   2. Appendix not visualized and cannot exclude appendicitis from   this examination. If there remains high clinical concern consider   follow-up CT or MRI.      Electronically signed by:  Yves Martinez  6/8/2017 3:55 AM CDT   Workstation: [id number]      US Testicular or Ovarian Vascular Limited   Final Result   1. Essentially unremarkable examination with no intrauterine   pregnancy identified however one is not expected to be identified   with the patient's beta-hCG being so low. Clinical follow-up is   needed.   2. Appendix not visualized and cannot exclude appendicitis from   this examination. If there remains high clinical concern consider   follow-up CT or MRI.      Electronically signed by:  Yves Martinez  6/8/2017 3:55 AM CDT   Workstation: [id number]        Ectopic vrs. Appendicitis.  Awaiting MRI this morning.  Consultation with Ob v. Surgery at that point.  To Dr. Donovan awaiting MRI.            MDM    Final diagnoses:   Acute appendicitis with localized peritonitis   Less than 8 weeks gestation of pregnancy            Ritchie Donovan MD  06/08/17 0934

## 2017-06-08 NOTE — ANESTHESIA PROCEDURE NOTES
Airway  Airway not difficult    General Information and Staff    Patient location during procedure: OR    Indications and Patient Condition  Indications for airway management: airway protection    Preoxygenated: yes  MILS maintained throughout  Mask difficulty assessment: 0 - not attempted    Final Airway Details  Final airway type: endotracheal airway      Successful airway: ETT  Cuffed: yes   Successful intubation technique: direct laryngoscopy  ETT size: 7.0 mm  Cormack-Lehane Classification: grade I - full view of glottis  Placement verified by: chest auscultation   Inital cuff pressure (cm H2O): 21  Measured from: lips  Number of attempts at approach: 1

## 2017-06-08 NOTE — ANESTHESIA POSTPROCEDURE EVALUATION
Patient: Shelley Rico    Procedure Summary     Date Anesthesia Start Anesthesia Stop Room / Location    06/08/17 9716 1619 [facility] OR 10 / [facility] OR       Procedure Diagnosis Surgeon Provider    LAPAROSCOPIC EXPLORATION FOR ECTOPIC PREGNANCY (Abdomen); SALPINGECTOMY LAPAROSCOPIC (Bilateral Abdomen) Acute appendicitis with localized peritonitis  (Acute appendicitis with localized peritonitis [K35.3]) MD Jameel Vogt MD          Anesthesia Type: general  Last vitals  BP      Temp      Pulse     Resp      SpO2        Post Anesthesia Care and Evaluation    Patient location during evaluation: PACU  Patient participation: complete - patient participated  Level of consciousness: awake and alert  Pain score: 0  Pain management: adequate  Airway patency: patent  Anesthetic complications: No anesthetic complications  PONV Status: none  Cardiovascular status: acceptable  Respiratory status: acceptable  Hydration status: acceptable

## 2017-06-08 NOTE — ED NOTES
Notified patient that she will be going to Same Day surgery.      Johanna Spencer RN  06/08/17 9626

## 2017-06-08 NOTE — ED TRIAGE NOTES
Reports is having RLQ pain. States has had a tubal in 2011.  is 8 days on her period and has had a pos pregnancy test at home.

## 2017-06-09 LAB
BACTERIA SPEC AEROBE CULT: NORMAL
C TRACH RRNA CVX QL NAA+PROBE: NOT DETECTED
N GONORRHOEA RRNA SPEC QL NAA+PROBE: NOT DETECTED

## 2017-06-09 NOTE — OP NOTE
LAPAROSCOPIC EXPLORATION FOR ECTOPIC PREGNANCY, SALPINGECTOMY LAPAROSCOPIC  Procedure Note    Shelley Rico  6/8/2017    Pre-op Diagnosis:   Acute appendicitis with localized peritonitis [K35.3]    Post-op Diagnosis:     Post-Op Diagnosis Codes:  Ruptured ectopic pregnancy      Procedure(s):  LAPAROSCOPY    Surgeon(s):  Franky Peters MD      Anesthesia: GETA    Staff:   Circulator: Sue Pickard; Sarah Sheehan RN  Scrub Person: Janis England  Assistant: Tierra Cordoba CSA    Estimated Blood Loss: 2 cc    Specimens: None      Drains:       [REMOVED] Naso/Oral/Gastric Tube 06/08/17 1445 nasogastric right nostril (Removed)   Removed 06/08/17 1556       [REMOVED] Urethral Catheter 06/08/17 1400 100% silicone 16 10 10 (Removed)   Removed 06/08/17 1605       Findings: Ectopic pregnancy    Complications: None    Indications:  26 year old white woman with a 2 day hx of RLQ abdominal pain. Previous tubal ligation, but a positive pregnancy test. MRI had suggested appendicitis. Taken to the operating room for laparoscopy and appendectomy as appropriate.    Description of procedure: The patient was brought to the operating room and placed supine on the operating table. After adequate GETA, the abdominal area was prepped and draped in a sterile manner. Briefing and timeout were performed and all parties were in agreement.    A transverse infraumbilical incision was made and carried to the umbilical stalk. This was encircled with a right angle and divided. RIGHT and LEFT sided 0-vicryl sutures were placed and the abdominal cavity was entered under direct vision with no visceral injury with a blunt tipped trocar. The abdomen was insufflated with 4.0 liters of CO2. A 5 mm laparoscope was placed into the abdomen and an excellent view was obtained. A second 5 mm trocar was placed into the low abdomen through a previous Pfannensteil  Incision.     There was a large amount of blood in the pelvis and over the liver. It  was emanating from a RIGHT sided ectopic pregnancy. The clot around the ovary was left intact. All other clot including perihepatic and perisplenic hemorrhage was aspirated from the abdomen.    Dr. Jaramillo was immediately called.    My portion of the procedure was terminate; she tolerated my portion well.            This document has been electronically signed by Franky Peters MD on June 8, 2017 11:24 PM      Date: 6/8/2017  Time: 11:24 PM

## 2017-06-12 LAB
LAB AP CASE REPORT: NORMAL
Lab: NORMAL
PATH REPORT.FINAL DX SPEC: NORMAL
PATH REPORT.GROSS SPEC: NORMAL

## 2017-06-15 ENCOUNTER — OFFICE VISIT (OUTPATIENT)
Dept: OBSTETRICS AND GYNECOLOGY | Facility: CLINIC | Age: 27
End: 2017-06-15

## 2017-06-15 VITALS
BODY MASS INDEX: 20.73 KG/M2 | SYSTOLIC BLOOD PRESSURE: 105 MMHG | WEIGHT: 117 LBS | HEIGHT: 63 IN | DIASTOLIC BLOOD PRESSURE: 67 MMHG

## 2017-06-15 DIAGNOSIS — N92.6 IRREGULAR UTERINE BLEEDING: ICD-10-CM

## 2017-06-15 DIAGNOSIS — O00.109 TUBAL PREGNANCY WITHOUT INTRAUTERINE PREGNANCY: Primary | ICD-10-CM

## 2017-06-15 PROBLEM — K35.30 ACUTE APPENDICITIS WITH LOCALIZED PERITONITIS: Status: RESOLVED | Noted: 2017-06-08 | Resolved: 2017-06-15

## 2017-06-15 PROCEDURE — 99024 POSTOP FOLLOW-UP VISIT: CPT | Performed by: OBSTETRICS & GYNECOLOGY

## 2017-06-15 NOTE — PROGRESS NOTES
Shelley Rico is a 26 y.o. y/o female     Chief Complaint: Irregular bleeding after surgery for ectopic pregnancy    HPI: 26-year-old who has had 3 children and had tubal sterilization several years ago with her last  section.  She and her  have 6 children total.  She did not regret the decision to have her tubes tied.  She was shocked when she found out she was pregnant.  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    She presented to the emergency room early in the morning of 2017 for right lower quadrant pain.  She was not having any vaginal bleeding at that time.  Her pregnancy test was positive and her quantitative hCG was around 400.  Ultrasound showed nothing in the uterus but also no evidence of ectopic pregnancy.  Exam and MRI were suspicious for acute appendicitis.    Dr. Peters in general surgery took the patient to the operating room for laparoscopy and possible appendectomy.  At laparoscopy noted her to have a ruptured right ectopic pregnancy with hemoperitoneum.  He consulted me, and I performed bilateral salpingectomies.  Filshie clips were placed at the cornu of both fallopian tubes.    She is now having some vaginal bleeding.  No other complaints.    I reviewed the surgery and the surgical pictures with her.    Review of Systems   Constitutional: Negative for activity change, appetite change, chills, diaphoresis, fatigue, fever and unexpected weight change.   Gastrointestinal: Negative for abdominal pain, constipation, diarrhea and nausea.   Genitourinary: Positive for menstrual problem and vaginal bleeding. Negative for difficulty urinating, dyspareunia, dysuria, pelvic pain, urgency, vaginal discharge and vaginal pain.   Neurological: Negative for headaches.   Psychiatric/Behavioral: Negative for dysphoric mood. The patient is not nervous/anxious.       Breast ROS: negative    The following portions of the patient's history were reviewed and updated as appropriate: allergies,  current medications, past family history, past medical history, past social history, past surgical history and problem list.    Allergies   Allergen Reactions   • Bactrim [Sulfamethoxazole-Trimethoprim] Hives   • Morphine And Related Shortness Of Breath   • Penicillins Other (See Comments)          Current Outpatient Prescriptions:   •  HYDROcodone-acetaminophen (NORCO) 5-325 MG per tablet, Take 1 tablet by mouth Every 6 (Six) Hours As Needed for Severe Pain (7-10)., Disp: 40 tablet, Rfl: 0  •  ibuprofen (ADVIL,MOTRIN) 800 MG tablet, Take 1 tablet by mouth Every 8 (Eight) Hours As Needed for Mild Pain (1-3)., Disp: 60 tablet, Rfl: 12  •  ondansetron ODT (ZOFRAN ODT) 8 MG disintegrating tablet, Take 1 tablet by mouth Daily., Disp: 30 tablet, Rfl: 11  •  promethazine (PHENERGAN) 25 MG tablet, Take 1 tablet by mouth Every 6 (Six) Hours As Needed for Nausea or Vomiting., Disp: 60 tablet, Rfl: 11     The patient has a family history of   No family history on file.     Past Medical History:   Diagnosis Date   • Kidney stone    • Tubal pregnancy without intrauterine pregnancy 6/15/2017        OB History      Para Term  AB TAB SAB Ectopic Multiple Living    1                    Social History     Social History   • Marital status:      Spouse name: N/A   • Number of children: N/A   • Years of education: N/A     Occupational History   • Not on file.     Social History Main Topics   • Smoking status: Never Smoker   • Smokeless tobacco: Not on file   • Alcohol use No   • Drug use: No   • Sexual activity: Yes     Other Topics Concern   • Not on file     Social History Narrative        Past Surgical History:   Procedure Laterality Date   •  SECTION     • CHOLECYSTECTOMY     • LAPAROSCOPIC EXPLORATION FOR ECTOPIC PREGNANCY Bilateral 2017    Procedure: LAPAROSCOPIC EXPLORATION FOR ECTOPIC PREGNANCY;  Surgeon: Duy Jaramillo MD;  Location: [facility];  Service:    • SALPINGECTOMY Bilateral 2017     "Procedure: SALPINGECTOMY LAPAROSCOPIC;  Surgeon: Duy Jaramillo MD;  Location: [facility];  Service:    • TONSILLECTOMY     • TUBAL ABDOMINAL LIGATION          Patient Active Problem List   Diagnosis   (none) - all problems resolved or deleted        Documented Vitals    06/15/17 1029   BP: 105/67   Weight: 117 lb (53.1 kg)   Height: 63\" (160 cm)   PainSc: 0-No pain       Physical Exam   Constitutional: She is oriented to person, place, and time. She appears well-developed and well-nourished. No distress.   117 pounds with BMI 20.7.   HENT:   Head: Normocephalic and atraumatic.   Eyes: Conjunctivae and EOM are normal. Pupils are equal, round, and reactive to light.   Neck: Normal range of motion. Neck supple. No JVD present. No tracheal deviation present. No thyromegaly present.   Cardiovascular: Normal rate, regular rhythm, normal heart sounds and intact distal pulses.  Exam reveals no gallop and no friction rub.    No murmur heard.  Pulmonary/Chest: Effort normal and breath sounds normal. No stridor. No respiratory distress. She has no wheezes. She has no rales. She exhibits no tenderness.   Abdominal: Soft. Bowel sounds are normal. She exhibits no distension and no mass. There is no tenderness. There is no rebound and no guarding. No hernia.   3 laparoscopic incisions healing nicely.   Musculoskeletal: Normal range of motion. She exhibits no edema, tenderness or deformity.   Lymphadenopathy:     She has no cervical adenopathy.   Neurological: She is alert and oriented to person, place, and time. She has normal reflexes. She displays normal reflexes. No cranial nerve deficit. She exhibits normal muscle tone. Coordination normal.   Skin: Skin is warm and dry. No rash noted. She is not diaphoretic. No erythema. No pallor.   Psychiatric: She has a normal mood and affect. Her behavior is normal. Judgment and thought content normal.   Nursing note and vitals reviewed.      Assessment        Diagnosis Plan   1. Tubal " pregnancy without intrauterine pregnancy           Plan    1.  Follow-up in 3 months.  Follow-up sooner as needed.                  This document has been electronically signed by Duy Jaramillo MD on Kenyetta 15, 2017 11:52 AM

## 2017-09-14 ENCOUNTER — OFFICE VISIT (OUTPATIENT)
Dept: OBSTETRICS AND GYNECOLOGY | Facility: CLINIC | Age: 27
End: 2017-09-14

## 2017-09-14 VITALS
SYSTOLIC BLOOD PRESSURE: 110 MMHG | DIASTOLIC BLOOD PRESSURE: 70 MMHG | WEIGHT: 121.6 LBS | BODY MASS INDEX: 21.55 KG/M2 | HEIGHT: 63 IN

## 2017-09-14 DIAGNOSIS — N92.1 MENOMETRORRHAGIA: Primary | Chronic | ICD-10-CM

## 2017-09-14 DIAGNOSIS — F33.41 RECURRENT MAJOR DEPRESSIVE DISORDER, IN PARTIAL REMISSION (HCC): Chronic | ICD-10-CM

## 2017-09-14 PROCEDURE — 99214 OFFICE O/P EST MOD 30 MIN: CPT | Performed by: OBSTETRICS & GYNECOLOGY

## 2017-09-14 RX ORDER — BUPROPION HYDROCHLORIDE 300 MG/1
300 TABLET ORAL DAILY
Qty: 30 TABLET | Refills: 12 | Status: SHIPPED | OUTPATIENT
Start: 2017-09-14 | End: 2017-09-21

## 2017-09-14 RX ORDER — TRANEXAMIC ACID 650 MG/1
2 TABLET ORAL 3 TIMES DAILY
Qty: 30 TABLET | Refills: 12 | Status: SHIPPED | OUTPATIENT
Start: 2017-09-14 | End: 2017-11-16

## 2017-09-14 NOTE — PROGRESS NOTES
Shelley Rico is a 27 y.o. y/o female     Chief Complaint: Heavy painful periods and depression with crying spells    HPI: 27-year-old who has had 3 children and had tubal sterilization several years ago with her last  section.  She and her  have 6 children total.  She did not regret the decision to have her tubes tied.  She was shocked when she found out she was pregnant.  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  On 2017 she underwent laparoscopic bilateral salpingectomies removing her ectopic pregnancy on the right.  I then placed Filshie clips on each cornua.     For several years she has had heavy painful periods.  For the past 3 months she has had some depression and crying spells.  She doesn't want to take any medicine that'll cause any weight gain.    I talked with her about trying Lysteda with her menses, and she is interested in giving that a try.    We discussed taking Wellbutrin  mg a day, and she is also interested in trying that.    I gave her handouts on exercise, stress reduction, depression, and vitamin use.    Review of Systems   Constitutional: Positive for fatigue. Negative for activity change, appetite change, chills, diaphoresis, fever and unexpected weight change.   Gastrointestinal: Negative for abdominal pain, constipation, diarrhea and nausea.   Genitourinary: Positive for menstrual problem. Negative for difficulty urinating, dyspareunia, dysuria, pelvic pain, urgency, vaginal bleeding, vaginal discharge and vaginal pain.   Neurological: Negative for headaches.   Psychiatric/Behavioral: Positive for dysphoric mood. The patient is not nervous/anxious.    All other systems reviewed and are negative.     Breast ROS: negative    The following portions of the patient's history were reviewed and updated as appropriate: allergies, current medications, past family history, past medical history, past social history, past surgical history and problem list.    Allergies    Allergen Reactions   • Bactrim [Sulfamethoxazole-Trimethoprim] Hives   • Morphine And Related Shortness Of Breath   • Penicillins Other (See Comments)          Current Outpatient Prescriptions:   •  ibuprofen (ADVIL,MOTRIN) 800 MG tablet, Take 1 tablet by mouth Every 8 (Eight) Hours As Needed for Mild Pain (1-3)., Disp: 60 tablet, Rfl: 12  •  buPROPion XL (WELLBUTRIN XL) 300 MG 24 hr tablet, Take 1 tablet by mouth Daily., Disp: 30 tablet, Rfl: 12  •  HYDROcodone-acetaminophen (NORCO) 5-325 MG per tablet, Take 1 tablet by mouth Every 6 (Six) Hours As Needed for Severe Pain (7-10)., Disp: 40 tablet, Rfl: 0  •  ondansetron ODT (ZOFRAN ODT) 8 MG disintegrating tablet, Take 1 tablet by mouth Daily., Disp: 30 tablet, Rfl: 11  •  promethazine (PHENERGAN) 25 MG tablet, Take 1 tablet by mouth Every 6 (Six) Hours As Needed for Nausea or Vomiting., Disp: 60 tablet, Rfl: 11  •  Tranexamic Acid 650 MG tablet, Take 2 tablets by mouth 3 (Three) Times a Day. For 5 days starting on first day of menses, Disp: 30 tablet, Rfl: 12     The patient has a family history of   No family history on file.     Past Medical History:   Diagnosis Date   • Kidney stone    • Menometrorrhagia 2017   • Recurrent major depressive disorder, in partial remission 2017   • Tubal pregnancy without intrauterine pregnancy 6/15/2017        OB History      Para Term  AB TAB SAB Ectopic Multiple Living    1                    Social History     Social History   • Marital status:      Spouse name: N/A   • Number of children: N/A   • Years of education: N/A     Occupational History   • Not on file.     Social History Main Topics   • Smoking status: Never Smoker   • Smokeless tobacco: Never Used   • Alcohol use No   • Drug use: No   • Sexual activity: Yes     Birth control/ protection: None     Other Topics Concern   • Not on file     Social History Narrative        Past Surgical History:   Procedure Laterality Date   •   "SECTION     • CHOLECYSTECTOMY     • LAPAROSCOPIC EXPLORATION FOR ECTOPIC PREGNANCY Bilateral 6/8/2017    Procedure: LAPAROSCOPIC EXPLORATION FOR ECTOPIC PREGNANCY;  Surgeon: Duy Jaramillo MD;  Location: [facility];  Service:    • SALPINGECTOMY Bilateral 6/8/2017    Procedure: SALPINGECTOMY LAPAROSCOPIC;  Surgeon: Duy Jaramillo MD;  Location: [facility];  Service:    • TONSILLECTOMY     • TUBAL ABDOMINAL LIGATION          Patient Active Problem List   Diagnosis   • Menometrorrhagia   • Recurrent major depressive disorder, in partial remission        Documented Vitals    09/14/17 0904   BP: 110/70   Weight: 121 lb 9.6 oz (55.2 kg)   Height: 63\" (160 cm)   PainSc: 0-No pain       Physical Exam   Constitutional: She is oriented to person, place, and time. She appears well-developed and well-nourished. No distress.   121.6 pounds with BMI 21.5   HENT:   Head: Normocephalic and atraumatic.   Eyes: Conjunctivae and EOM are normal. Pupils are equal, round, and reactive to light.   Neck: Normal range of motion. Neck supple. No JVD present. No tracheal deviation present. No thyromegaly present.   Cardiovascular: Normal rate, regular rhythm, normal heart sounds and intact distal pulses.  Exam reveals no gallop and no friction rub.    No murmur heard.  Pulmonary/Chest: Effort normal and breath sounds normal. No stridor. No respiratory distress. She has no wheezes. She has no rales. She exhibits no tenderness.   Abdominal: Soft. Bowel sounds are normal. She exhibits no distension and no mass. There is no tenderness. There is no rebound and no guarding. No hernia.   Musculoskeletal: Normal range of motion. She exhibits no edema, tenderness or deformity.   Lymphadenopathy:     She has no cervical adenopathy.   Neurological: She is alert and oriented to person, place, and time. She has normal reflexes. She displays normal reflexes. No cranial nerve deficit. She exhibits normal muscle tone. Coordination normal.   Skin: Skin is warm " and dry. No rash noted. She is not diaphoretic. No erythema. No pallor.   Psychiatric: She has a normal mood and affect. Her behavior is normal. Judgment and thought content normal.   Nursing note and vitals reviewed.       Assessment        Diagnosis Plan   1. Menometrorrhagia     2. Recurrent major depressive disorder, in partial remission           Plan      1. Wellbutrin  mg per day.  2. Lysteda 3 times a day starting with first day of menses.  3. Encouraged in diet and exercise.  4. Handouts on depression, hot flashes, exercise, and vitamin use.   5. Follow-up in 6 weeks.  Follow-up sooner as needed.            This document has been electronically signed by Duy Jaramillo MD on September 14, 2017 1:24 PM

## 2017-09-19 ENCOUNTER — TELEPHONE (OUTPATIENT)
Dept: OBSTETRICS AND GYNECOLOGY | Facility: CLINIC | Age: 27
End: 2017-09-19

## 2017-09-19 NOTE — TELEPHONE ENCOUNTER
----- Message from Yue Borges sent at 9/18/2017  1:30 PM CDT -----  Regarding: MEDS/ALLERGIC REACTION  Contact: [phone]  PLEASE CALL...

## 2017-09-21 ENCOUNTER — OFFICE VISIT (OUTPATIENT)
Dept: FAMILY MEDICINE CLINIC | Facility: CLINIC | Age: 27
End: 2017-09-21

## 2017-09-21 ENCOUNTER — LAB (OUTPATIENT)
Dept: LAB | Facility: OTHER | Age: 27
End: 2017-09-21

## 2017-09-21 VITALS — OXYGEN SATURATION: 97 % | HEART RATE: 74 BPM | HEIGHT: 63 IN | BODY MASS INDEX: 21.26 KG/M2 | WEIGHT: 120 LBS

## 2017-09-21 DIAGNOSIS — F41.9 ANXIETY: ICD-10-CM

## 2017-09-21 DIAGNOSIS — F41.9 ANXIETY: Primary | ICD-10-CM

## 2017-09-21 DIAGNOSIS — F32.0 MILD SINGLE CURRENT EPISODE OF MAJOR DEPRESSIVE DISORDER (HCC): ICD-10-CM

## 2017-09-21 DIAGNOSIS — G47.00 INSOMNIA, UNSPECIFIED TYPE: ICD-10-CM

## 2017-09-21 LAB
ALBUMIN SERPL-MCNC: 4.9 G/DL (ref 3.2–5.5)
ALBUMIN/GLOB SERPL: 1.9 G/DL (ref 1–3)
ALP SERPL-CCNC: 57 U/L (ref 15–121)
ALT SERPL W P-5'-P-CCNC: 18 U/L (ref 10–60)
ANION GAP SERPL CALCULATED.3IONS-SCNC: 11 MMOL/L (ref 5–15)
AST SERPL-CCNC: 21 U/L (ref 10–60)
BASOPHILS # BLD AUTO: 0.01 10*3/MM3 (ref 0–0.2)
BASOPHILS NFR BLD AUTO: 0.2 % (ref 0–2)
BILIRUB SERPL-MCNC: 0.7 MG/DL (ref 0.2–1)
BUN BLD-MCNC: 16 MG/DL (ref 8–25)
BUN/CREAT SERPL: 22.9 (ref 7–25)
CALCIUM SPEC-SCNC: 9.4 MG/DL (ref 8.4–10.8)
CHLORIDE SERPL-SCNC: 105 MMOL/L (ref 100–112)
CO2 SERPL-SCNC: 24 MMOL/L (ref 20–32)
CREAT BLD-MCNC: 0.7 MG/DL (ref 0.4–1.3)
DEPRECATED RDW RBC AUTO: 39.4 FL (ref 36.4–46.3)
EOSINOPHIL # BLD AUTO: 0.12 10*3/MM3 (ref 0–0.7)
EOSINOPHIL NFR BLD AUTO: 1.9 % (ref 0–7)
ERYTHROCYTE [DISTWIDTH] IN BLOOD BY AUTOMATED COUNT: 11.9 % (ref 11.5–14.5)
GFR SERPL CREATININE-BSD FRML MDRD: 99 ML/MIN/1.73 (ref 71–165)
GLOBULIN UR ELPH-MCNC: 2.6 GM/DL (ref 2.5–4.6)
GLUCOSE BLD-MCNC: 76 MG/DL (ref 70–100)
HCT VFR BLD AUTO: 39.1 % (ref 35–45)
HGB BLD-MCNC: 13.5 G/DL (ref 12–15.5)
LYMPHOCYTES # BLD AUTO: 2.32 10*3/MM3 (ref 0.6–4.2)
LYMPHOCYTES NFR BLD AUTO: 37.5 % (ref 10–50)
MCH RBC QN AUTO: 32.1 PG (ref 26.5–34)
MCHC RBC AUTO-ENTMCNC: 34.5 G/DL (ref 31.4–36)
MCV RBC AUTO: 93.1 FL (ref 80–98)
MONOCYTES # BLD AUTO: 0.51 10*3/MM3 (ref 0–0.9)
MONOCYTES NFR BLD AUTO: 8.3 % (ref 0–12)
NEUTROPHILS # BLD AUTO: 3.22 10*3/MM3 (ref 2–8.6)
NEUTROPHILS NFR BLD AUTO: 52.1 % (ref 37–80)
PLATELET # BLD AUTO: 229 10*3/MM3 (ref 150–450)
PMV BLD AUTO: 10.4 FL (ref 8–12)
POTASSIUM BLD-SCNC: 3.6 MMOL/L (ref 3.4–5.4)
PROT SERPL-MCNC: 7.5 G/DL (ref 6.7–8.2)
RBC # BLD AUTO: 4.2 10*6/MM3 (ref 3.77–5.16)
SODIUM BLD-SCNC: 140 MMOL/L (ref 134–146)
TSH SERPL DL<=0.05 MIU/L-ACNC: 1.56 MIU/ML (ref 0.46–4.68)
WBC NRBC COR # BLD: 6.18 10*3/MM3 (ref 3.2–9.8)

## 2017-09-21 PROCEDURE — 85025 COMPLETE CBC W/AUTO DIFF WBC: CPT | Performed by: NURSE PRACTITIONER

## 2017-09-21 PROCEDURE — 80053 COMPREHEN METABOLIC PANEL: CPT | Performed by: NURSE PRACTITIONER

## 2017-09-21 PROCEDURE — 36415 COLL VENOUS BLD VENIPUNCTURE: CPT | Performed by: NURSE PRACTITIONER

## 2017-09-21 PROCEDURE — 99213 OFFICE O/P EST LOW 20 MIN: CPT | Performed by: NURSE PRACTITIONER

## 2017-09-21 PROCEDURE — 84443 ASSAY THYROID STIM HORMONE: CPT | Performed by: NURSE PRACTITIONER

## 2017-09-21 RX ORDER — BUSPIRONE HYDROCHLORIDE 15 MG/1
15 TABLET ORAL 2 TIMES DAILY PRN
Qty: 60 TABLET | Refills: 2 | Status: SHIPPED | OUTPATIENT
Start: 2017-09-21 | End: 2017-10-16 | Stop reason: SINTOL

## 2017-09-21 NOTE — PROGRESS NOTES
Subjective   Shelley Rico is a 27 y.o. female. Patient here today with complaints of Med Refill and Anxiety  pt here today with complaints of anxiety, sadness, states she is nervous, crying most of the time, denies thoughts of suicide/homicide. Has 3 sons and has custody of her niece who is 5 months old. She did have tubal ligation in 2008 and then had ectopic pregnancy in 6/17. At that time she had bilat tube removal, ovaries and uterus not removed. This was per dr wright. She states above symptoms are worsening since her surgery. She is not on HRT. She takes tylenol pm to sleep nightly, works 3 rd shift. She has trouble falling and staying asleep. She was prescribed wellbutrin and took 1 dose 2 days ago and developed a rash. She did not take any more.     Vitals:    09/21/17 0902   Pulse: 74   SpO2: 97%     Past Medical History:   Diagnosis Date   • Abdominal pain    • Alopecia    • Anxiety    • Contact dermatitis    • Disorder of gallbladder     gallstones    • Dysuria    • Gastroesophageal reflux disease    • Itch of skin    • Kidney stone    • Menometrorrhagia 9/14/2017   • Menometrorrhagia    • Pruritic rash     vesicular rash      • Recurrent major depressive disorder, in partial remission 9/14/2017   • Right upper quadrant pain    • Screening for respiratory condition    • Tubal pregnancy without intrauterine pregnancy 6/15/2017   • Tuberculosis screening    • Urinary tract infectious disease      Anxiety   Presents for initial visit. Onset was 1 to 6 months ago. The problem has been unchanged. Symptoms include depressed mood, excessive worry, insomnia, irritability, malaise, nervous/anxious behavior and restlessness. Patient reports no chest pain, compulsions, confusion, dizziness, dry mouth, feeling of choking, muscle tension, nausea, palpitations, panic, shortness of breath or suicidal ideas. Symptoms occur most days. The severity of symptoms is moderate. Nothing aggravates the symptoms. The  quality of sleep is poor. Nighttime awakenings: several.     Her past medical history is significant for depression.   Depression   Visit Type: initial  Onset of symptoms: at an unknown time  Patient presents with the following symptoms: depressed mood, excessive worry, insomnia, irritability, malaise, nervousness/anxiety and restlessness.  Patient is not experiencing: compulsions, confusion, dry mouth, muscle tension, palpitations, panic, shortness of breath and suicidal ideas.  Frequency of symptoms: occasionally   Severity: mild   Sleep quality: poor  Nighttime awakenings: many         The following portions of the patient's history were reviewed and updated as appropriate: allergies, current medications, past family history, past medical history, past social history, past surgical history and problem list.    Review of Systems   Constitutional: Positive for irritability.   HENT: Negative.    Eyes: Negative.    Respiratory: Negative.  Negative for shortness of breath.    Cardiovascular: Negative.  Negative for chest pain and palpitations.   Gastrointestinal: Negative.  Negative for nausea.   Endocrine: Negative.    Genitourinary: Negative.    Musculoskeletal: Negative.    Skin: Negative.    Allergic/Immunologic: Negative.    Neurological: Negative.  Negative for dizziness.   Hematological: Negative.    Psychiatric/Behavioral: Negative for confusion and suicidal ideas. The patient is nervous/anxious and has insomnia.        Objective   Physical Exam   Constitutional: She is oriented to person, place, and time. She appears well-developed and well-nourished. No distress.   HENT:   Head: Normocephalic and atraumatic.   Cardiovascular: Normal rate, regular rhythm and normal heart sounds.  Exam reveals no gallop and no friction rub.    No murmur heard.  Pulmonary/Chest: Effort normal and breath sounds normal. No respiratory distress. She has no wheezes. She has no rales.   Musculoskeletal: Normal range of motion.    Neurological: She is alert and oriented to person, place, and time.   Skin: Skin is warm and dry. No rash noted. She is not diaphoretic. No erythema. No pallor.   Psychiatric: Her behavior is normal. Judgment and thought content normal. Her mood appears anxious. Her affect is not angry, not blunt, not labile and not inappropriate. Her speech is not rapid and/or pressured, not delayed, not tangential and not slurred. She is not actively hallucinating. Cognition and memory are normal. She is communicative.   Appears well kempt, discusses her problems openly, makes eye contact,denies thoughts of suicide/homicide She is attentive.   Nursing note and vitals reviewed.      Assessment/Plan   Shelley was seen today for med refill and anxiety.    Diagnoses and all orders for this visit:    Anxiety  -     CBC & Differential; Future  -     Comprehensive Metabolic Panel; Future  -     TSH; Future    Insomnia, unspecified type    Mild single current episode of major depressive disorder    Other orders  -     busPIRone (BUSPAR) 15 MG tablet; Take 1 tablet by mouth 2 (Two) Times a Day As Needed (anxiety).    she is given buspar prn as above, is advised to RTC in 1 month for recheck, sooner if nec. She is not breast feeding. She is aware and in agreement to this plan. She was advised on potential side effects of medication. All questions and concerns are addressed with understanding noted.

## 2017-09-25 ENCOUNTER — TELEPHONE (OUTPATIENT)
Dept: FAMILY MEDICINE CLINIC | Facility: CLINIC | Age: 27
End: 2017-09-25

## 2017-09-28 ENCOUNTER — OFFICE VISIT (OUTPATIENT)
Dept: FAMILY MEDICINE CLINIC | Facility: CLINIC | Age: 27
End: 2017-09-28

## 2017-09-28 VITALS
SYSTOLIC BLOOD PRESSURE: 108 MMHG | BODY MASS INDEX: 21.97 KG/M2 | WEIGHT: 124 LBS | OXYGEN SATURATION: 96 % | HEIGHT: 63 IN | DIASTOLIC BLOOD PRESSURE: 68 MMHG | HEART RATE: 72 BPM

## 2017-09-28 DIAGNOSIS — F33.0 MILD EPISODE OF RECURRENT MAJOR DEPRESSIVE DISORDER (HCC): Chronic | ICD-10-CM

## 2017-09-28 DIAGNOSIS — F41.9 ANXIETY: Primary | Chronic | ICD-10-CM

## 2017-09-28 PROCEDURE — 99214 OFFICE O/P EST MOD 30 MIN: CPT | Performed by: NURSE PRACTITIONER

## 2017-09-28 RX ORDER — HYDROXYZINE HYDROCHLORIDE 25 MG/1
25 TABLET, FILM COATED ORAL 3 TIMES DAILY PRN
Qty: 90 TABLET | Refills: 0 | Status: SHIPPED | OUTPATIENT
Start: 2017-09-28 | End: 2017-11-16 | Stop reason: SDUPTHER

## 2017-09-28 NOTE — PROGRESS NOTES
Subjective   Shelley Rico is a 27 y.o. female. Patient here today with complaints of Medication Problem (Would like to change Buspar. )  pt here today for recheck of anxiety and depression, states she is crying more days than she isn't. She was placed on buspar but after 2 doses she had dizziness and nausea and cannot tolerate the medicine. She is working 3rd shift, states she does not feel as depressed or anxious when at work.     Vitals:    09/28/17 0813   BP: 108/68   Pulse: 72   SpO2: 96%     Past Medical History:   Diagnosis Date   • Abdominal pain    • Alopecia    • Anxiety    • Contact dermatitis    • Disorder of gallbladder     gallstones    • Dysuria    • Gastroesophageal reflux disease    • Itch of skin    • Kidney stone    • Menometrorrhagia 9/14/2017   • Menometrorrhagia    • Pruritic rash     vesicular rash      • Recurrent major depressive disorder, in partial remission 9/14/2017   • Right upper quadrant pain    • Screening for respiratory condition    • Tubal pregnancy without intrauterine pregnancy 6/15/2017   • Tuberculosis screening    • Urinary tract infectious disease      Anxiety   Presents for follow-up visit. Symptoms include depressed mood, dizziness, excessive worry, nausea, nervous/anxious behavior and restlessness. Patient reports no compulsions, confusion, dry mouth, shortness of breath or suicidal ideas. Symptoms occur most days. The severity of symptoms is moderate and causing significant distress.     Her past medical history is significant for depression. Compliance with medications is 0-25%.   Depression   Visit Type: follow-up  Patient presents with the following symptoms: depressed mood, dizziness, excessive worry, fatigue, nausea, nervousness/anxiety and restlessness.  Patient is not experiencing: compulsions, confusion, dry mouth, psychomotor agitation, psychomotor retardation, shortness of breath, suicidal ideas, suicidal planning, thoughts of death, weight gain and  weight loss.  Frequency of symptoms: most days   Severity: mild   Compliance with medications:  0-25%             The following portions of the patient's history were reviewed and updated as appropriate: allergies, current medications, past family history, past medical history, past social history, past surgical history and problem list.    Review of Systems   Constitutional: Negative.  Negative for weight gain and weight loss.   HENT: Negative.    Eyes: Negative.    Respiratory: Negative.  Negative for shortness of breath.    Cardiovascular: Negative.    Gastrointestinal: Positive for nausea.   Endocrine: Negative.    Genitourinary: Negative.    Musculoskeletal: Negative.    Skin: Negative.    Allergic/Immunologic: Negative.    Neurological: Positive for dizziness.   Hematological: Negative.    Psychiatric/Behavioral: Negative for confusion and suicidal ideas. The patient is nervous/anxious.        Objective   Physical Exam   Constitutional: She is oriented to person, place, and time. She appears well-developed and well-nourished. No distress.   HENT:   Head: Normocephalic and atraumatic.   Pulmonary/Chest: Effort normal.   Musculoskeletal: Normal range of motion.   Neurological: She is alert and oriented to person, place, and time.   Skin: Skin is warm and dry. No rash noted. She is not diaphoretic. No erythema. No pallor.   Psychiatric: Her speech is normal and behavior is normal. Judgment and thought content normal. Her mood appears anxious. Her affect is not angry, not blunt, not labile and not inappropriate. She is not actively hallucinating. Cognition and memory are normal. She exhibits a depressed mood.   She appears well kempt, makes eye contact, discusses her problems openly, is not tearful with conversation.  She is attentive.   Nursing note and vitals reviewed.      Assessment/Plan   Shelley was seen today for medication problem.    Diagnoses and all orders for this  visit:    Anxiety  Comments:  uncontrolled    Mild episode of recurrent major depressive disorder    Other orders  -     hydrOXYzine (ATARAX) 25 MG tablet; Take 1 tablet by mouth 3 (Three) Times a Day As Needed for Itching.      She states she is not good with compliance of medication. Will change her from prn buspar to prn atarax instead of starting daily antidepressants. She will let me know should her symptoms persist or worsen. Otherwise, she will RTC for recheck in 6 months or as scheduled for chronic conditions. She is aware and in agreement to this plan.  All questions and concerns are addressed with understanding noted.

## 2017-10-16 ENCOUNTER — OFFICE VISIT (OUTPATIENT)
Dept: FAMILY MEDICINE CLINIC | Facility: CLINIC | Age: 27
End: 2017-10-16

## 2017-10-16 VITALS
BODY MASS INDEX: 21.62 KG/M2 | WEIGHT: 122 LBS | HEART RATE: 78 BPM | DIASTOLIC BLOOD PRESSURE: 72 MMHG | HEIGHT: 63 IN | OXYGEN SATURATION: 98 % | SYSTOLIC BLOOD PRESSURE: 104 MMHG

## 2017-10-16 DIAGNOSIS — F33.1 MODERATE EPISODE OF RECURRENT MAJOR DEPRESSIVE DISORDER (HCC): Chronic | ICD-10-CM

## 2017-10-16 DIAGNOSIS — F41.9 ANXIETY: Primary | Chronic | ICD-10-CM

## 2017-10-16 PROCEDURE — 99213 OFFICE O/P EST LOW 20 MIN: CPT | Performed by: NURSE PRACTITIONER

## 2017-10-16 RX ORDER — ESCITALOPRAM OXALATE 10 MG/1
10 TABLET ORAL DAILY
Qty: 30 TABLET | Refills: 1 | Status: SHIPPED | OUTPATIENT
Start: 2017-10-16 | End: 2017-11-16 | Stop reason: SDUPTHER

## 2017-10-29 NOTE — PROGRESS NOTES
Subjective   Shelley Rico is a 27 y.o. female. Patient here today with complaints of Anxiety (1 month f/u. Doesnt feel like the medication is working. Makers her to tired so can only take it at night. )  pt here today for recheck of anxiety and depression , states was taking atarax prn anxiety and this caused too much sedation. She is really wanting a daily medicine and is now complaining also of depression, denies thoughts of suicide/homicide.     Vitals:    10/16/17 0908   BP: 104/72   Pulse: 78   SpO2: 98%     Past Medical History:   Diagnosis Date   • Abdominal pain    • Alopecia    • Anxiety    • Contact dermatitis    • Disorder of gallbladder     gallstones    • Dysuria    • Gastroesophageal reflux disease    • Itch of skin    • Kidney stone    • Menometrorrhagia 9/14/2017   • Menometrorrhagia    • Pruritic rash     vesicular rash      • Recurrent major depressive disorder, in partial remission 9/14/2017   • Right upper quadrant pain    • Screening for respiratory condition    • Tubal pregnancy without intrauterine pregnancy 6/15/2017   • Tuberculosis screening    • Urinary tract infectious disease      Anxiety   Presents for follow-up visit. Symptoms include depressed mood, excessive worry, malaise and nervous/anxious behavior. Patient reports no compulsions, confusion, palpitations, panic, restlessness, shortness of breath or suicidal ideas. Symptoms occur most days. The severity of symptoms is moderate.     Her past medical history is significant for depression. Compliance with medications is 51-75%.   Depression   Visit Type: follow-up  Patient presents with the following symptoms: depressed mood, excessive worry, fatigue, hypersomnia, malaise and nervousness/anxiety.  Patient is not experiencing: chest pain, choking sensation, compulsions, confusion, palpitations, panic, psychomotor agitation, psychomotor retardation, restlessness, shortness of breath, suicidal ideas, suicidal planning, thoughts  of death, weight gain and weight loss.  Frequency of symptoms: most days   Severity: moderate   Compliance with medications:  51-75%             The following portions of the patient's history were reviewed and updated as appropriate: allergies, current medications, past family history, past medical history, past social history, past surgical history and problem list.    Review of Systems   Constitutional: Negative.  Negative for weight gain and weight loss.   HENT: Negative.    Eyes: Negative.    Respiratory: Negative.  Negative for choking and shortness of breath.    Cardiovascular: Negative.  Negative for palpitations.   Gastrointestinal: Negative.    Endocrine: Negative.    Genitourinary: Negative.    Musculoskeletal: Negative.    Skin: Negative.    Allergic/Immunologic: Negative.    Neurological: Negative.    Hematological: Negative.    Psychiatric/Behavioral: Negative for confusion and suicidal ideas. The patient is nervous/anxious.        Objective   Physical Exam   Constitutional: She is oriented to person, place, and time. She appears well-developed and well-nourished. No distress.   HENT:   Head: Normocephalic and atraumatic.   Cardiovascular: Normal rate, regular rhythm and normal heart sounds.  Exam reveals no gallop and no friction rub.    No murmur heard.  Pulmonary/Chest: Effort normal and breath sounds normal. No respiratory distress. She has no wheezes. She has no rales.   Neurological: She is alert and oriented to person, place, and time.   Skin: Skin is warm and dry. No rash noted. She is not diaphoretic. No erythema. No pallor.   Psychiatric: Her speech is normal and behavior is normal. Judgment and thought content normal. Her mood appears anxious. Her affect is not angry, not blunt, not labile and not inappropriate. She is not actively hallucinating. Cognition and memory are normal. She exhibits a depressed mood.   Appears well kempt, discusses her problems fairly openly, makes eye contact.  Denies thoughts of suicide/homicide She is attentive.   Nursing note and vitals reviewed.      Assessment/Plan   Shelley was seen today for anxiety.    Diagnoses and all orders for this visit:    Anxiety    Moderate episode of recurrent major depressive disorder    Other orders  -     escitalopram (LEXAPRO) 10 MG tablet; Take 1 tablet by mouth Daily.      Will start her on lexapro as above, can cont on atarax prn sleep, anxiety. She is to RTC in 1 month for recheck, sooner if needed. She is made aware of potential side effects of meds and certainly will stop should she have any suicidal/homicidal ideations. She is aware and is in agreement to this plan.  All questions and concerns are addressed with understanding noted.

## 2017-11-16 ENCOUNTER — OFFICE VISIT (OUTPATIENT)
Dept: FAMILY MEDICINE CLINIC | Facility: CLINIC | Age: 27
End: 2017-11-16

## 2017-11-16 VITALS
HEART RATE: 88 BPM | OXYGEN SATURATION: 97 % | HEIGHT: 63 IN | DIASTOLIC BLOOD PRESSURE: 66 MMHG | WEIGHT: 123.4 LBS | TEMPERATURE: 98.7 F | SYSTOLIC BLOOD PRESSURE: 122 MMHG | BODY MASS INDEX: 21.86 KG/M2

## 2017-11-16 DIAGNOSIS — F33.41 RECURRENT MAJOR DEPRESSIVE DISORDER, IN PARTIAL REMISSION (HCC): Primary | Chronic | ICD-10-CM

## 2017-11-16 DIAGNOSIS — F41.9 ANXIETY: Chronic | ICD-10-CM

## 2017-11-16 DIAGNOSIS — G47.09 OTHER INSOMNIA: Chronic | ICD-10-CM

## 2017-11-16 PROCEDURE — 99214 OFFICE O/P EST MOD 30 MIN: CPT | Performed by: NURSE PRACTITIONER

## 2017-11-16 RX ORDER — ACYCLOVIR 50 MG/G
OINTMENT TOPICAL 4 TIMES DAILY
Qty: 30 G | Refills: 0 | Status: SHIPPED | OUTPATIENT
Start: 2017-11-16 | End: 2018-04-30

## 2017-11-16 RX ORDER — ESCITALOPRAM OXALATE 10 MG/1
10 TABLET ORAL DAILY
Qty: 30 TABLET | Refills: 5 | Status: SHIPPED | OUTPATIENT
Start: 2017-11-16 | End: 2018-04-30 | Stop reason: SINTOL

## 2017-11-16 RX ORDER — HYDROXYZINE HYDROCHLORIDE 25 MG/1
25 TABLET, FILM COATED ORAL 2 TIMES DAILY PRN
Qty: 90 TABLET | Refills: 5 | Status: SHIPPED | OUTPATIENT
Start: 2017-11-16 | End: 2019-09-26

## 2017-11-16 NOTE — PROGRESS NOTES
"Subjective   Shelley Rico is a 27 y.o. female. Patient here today with complaints of Follow-up and Anxiety    27 year old female presenting to the clinic for follow-up for Lexapro for Depression/anxiety.  The patient reports that she feels overall well and that she is sleeping much better now than before.  She reports that she is having less \"crying episodes\".  She reports that her depression is getting better.  Would like to continue medication at this time.  Denies side effects of meds including suicidal/homicidal ideations.     Vitals:    11/16/17 0831   BP: 122/66   Pulse: 88   Temp: 98.7 °F (37.1 °C)   SpO2: 97%     Past Medical History:   Diagnosis Date   • Abdominal pain    • Alopecia    • Anxiety    • Contact dermatitis    • Disorder of gallbladder     gallstones    • Dysuria    • Gastroesophageal reflux disease    • Itch of skin    • Kidney stone    • Menometrorrhagia 9/14/2017   • Menometrorrhagia    • Pruritic rash     vesicular rash      • Recurrent major depressive disorder, in partial remission 9/14/2017   • Right upper quadrant pain    • Screening for respiratory condition    • Tubal pregnancy without intrauterine pregnancy 6/15/2017   • Tuberculosis screening    • Urinary tract infectious disease      Depression   Visit Type: follow-up  Patient is not experiencing: chest pain, choking sensation, compulsions, confusion, decreased concentration, depressed mood (improved significantly ), dizziness, dry mouth, excessive worry, fatigue, feelings of hopelessness, feelings of worthlessness, hypersomnia, insomnia (improved significantly), irritability, malaise, memory impairment, muscle tension, nausea, nervousness/anxiety, obsessions, palpitations, panic, psychomotor agitation, psychomotor retardation, restlessness, shortness of breath, suicidal ideas, suicidal planning, thoughts of death, weight gain and weight loss.  Severity: moderate   Sleep quality: good  Compliance with medications:  " %  Side effects:  Insomnia  Anxiety   Presents for follow-up visit. Patient reports no compulsions, confusion, decreased concentration, depressed mood (improved significantly ), dry mouth, excessive worry, insomnia (improved significantly), irritability, malaise, muscle tension, nausea, nervous/anxious behavior, obsessions, palpitations, panic, restlessness, shortness of breath or suicidal ideas. Symptoms occur occasionally. The severity of symptoms is moderate. The quality of sleep is good.     Her past medical history is significant for depression. Compliance with medications is %.        The following portions of the patient's history were reviewed and updated as appropriate: allergies, current medications, past family history, past medical history, past social history, past surgical history and problem list.    Review of Systems   Constitutional: Negative.  Negative for irritability, weight gain and weight loss.   HENT: Negative.    Eyes: Negative.    Respiratory: Negative.  Negative for choking and shortness of breath.    Cardiovascular: Negative.  Negative for palpitations.   Gastrointestinal: Negative.  Negative for nausea.   Endocrine: Negative.    Genitourinary: Negative.    Musculoskeletal: Negative.    Skin: Negative.    Allergic/Immunologic: Negative.    Neurological: Negative.    Hematological: Negative.    Psychiatric/Behavioral: Negative.  Negative for confusion, decreased concentration and suicidal ideas. The patient is not nervous/anxious and does not have insomnia (improved significantly).        Objective   Physical Exam   Constitutional: She is oriented to person, place, and time. She appears well-developed and well-nourished. No distress.   HENT:   Head: Normocephalic and atraumatic.   Eyes: EOM are normal. Pupils are equal, round, and reactive to light.   Neck: Normal range of motion.   Cardiovascular: Normal rate, regular rhythm and normal heart sounds.  Exam reveals no gallop and  no friction rub.    No murmur heard.  Pulmonary/Chest: Effort normal and breath sounds normal.   Musculoskeletal: Normal range of motion.   Neurological: She is alert and oriented to person, place, and time.   Skin: Skin is warm and dry. No rash noted. She is not diaphoretic. No erythema. No pallor.   Psychiatric: She has a normal mood and affect. Her speech is normal and behavior is normal. Judgment and thought content normal. She is not actively hallucinating. Cognition and memory are normal.   Appears well kempt, discusses her problems openly, makes eye contact.  She is attentive.       Assessment/Plan   Shelley was seen today for follow-up and anxiety.    Diagnoses and all orders for this visit:    Recurrent major depressive disorder, in partial remission    Anxiety    Other insomnia    Other orders  -     escitalopram (LEXAPRO) 10 MG tablet; Take 1 tablet by mouth Daily.  -     hydrOXYzine (ATARAX) 25 MG tablet; Take 1 tablet by mouth 2 (Two) Times a Day As Needed for Anxiety.    Will refill medication at this time.  Call with questions or concerns. Follow up in 6 months, sooner if needed. All questions and concerns are addressed with understanding noted. The patient is aware and is in agreement to above plan.

## 2018-04-30 ENCOUNTER — OFFICE VISIT (OUTPATIENT)
Dept: FAMILY MEDICINE CLINIC | Facility: CLINIC | Age: 28
End: 2018-04-30

## 2018-04-30 VITALS
SYSTOLIC BLOOD PRESSURE: 120 MMHG | BODY MASS INDEX: 22.86 KG/M2 | HEART RATE: 83 BPM | DIASTOLIC BLOOD PRESSURE: 62 MMHG | HEIGHT: 63 IN | TEMPERATURE: 97.6 F | WEIGHT: 129 LBS

## 2018-04-30 DIAGNOSIS — L84 CALLUS: Primary | ICD-10-CM

## 2018-04-30 DIAGNOSIS — F41.9 ANXIETY: Primary | ICD-10-CM

## 2018-04-30 DIAGNOSIS — B07.0 PLANTAR WART OF LEFT FOOT: ICD-10-CM

## 2018-04-30 DIAGNOSIS — F33.0 MILD EPISODE OF RECURRENT MAJOR DEPRESSIVE DISORDER (HCC): ICD-10-CM

## 2018-04-30 DIAGNOSIS — G47.00 INSOMNIA, UNSPECIFIED TYPE: ICD-10-CM

## 2018-04-30 PROCEDURE — 99213 OFFICE O/P EST LOW 20 MIN: CPT | Performed by: NURSE PRACTITIONER

## 2018-04-30 RX ORDER — DESVENLAFAXINE SUCCINATE 50 MG/1
50 TABLET, EXTENDED RELEASE ORAL DAILY
Qty: 30 TABLET | Refills: 2 | Status: SHIPPED | OUTPATIENT
Start: 2018-04-30 | End: 2018-09-19

## 2018-04-30 NOTE — PROGRESS NOTES
"Subjective   Shelley Rico is a 27 y.o. female. Patient here today with complaints of Medication Problem  pt here today with complaints of anxiety, depression, insomnia. Was started on lexapro 11/17 and states this worked well for her mood however she has gained weight on this medication and would like to change it to a different antidepressant. States has gained 7 pounds since starting it. Denies thoughts of suicide/homicide. Cont on atarax prn sleep which does help as well. She has already started tapering off the dose of lexapro and is now taking 10mg q3rd day. States she has started to notice a difference in her mood since tapering off of this, reports increase in irritability. Also reports possible \"callous\" to L foot, has tried OTC meds without relief of symptoms.  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     Vitals:    04/30/18 0832   BP: 120/62   Pulse: 83   Temp: 97.6 °F (36.4 °C)     Past Medical History:   Diagnosis Date   • Abdominal pain    • Alopecia    • Anxiety    • Contact dermatitis    • Disorder of gallbladder     gallstones    • Dysuria    • Gastroesophageal reflux disease    • Itch of skin    • Kidney stone    • Menometrorrhagia 9/14/2017   • Menometrorrhagia    • Pruritic rash     vesicular rash      • Recurrent major depressive disorder, in partial remission 9/14/2017   • Right upper quadrant pain    • Screening for respiratory condition    • Tubal pregnancy without intrauterine pregnancy 6/15/2017   • Tuberculosis screening    • Urinary tract infectious disease      Anxiety   Presents for follow-up visit. Symptoms include excessive worry, insomnia, irritability and nervous/anxious behavior. Patient reports no chest pain, compulsions, confusion, decreased concentration, depressed mood, dizziness, dry mouth, feeling of choking, nausea, obsessions, palpitations, panic, restlessness, shortness of breath or suicidal ideas. Symptoms occur most days. The severity of symptoms is moderate.     Her " past medical history is significant for depression. Compliance with medications is 51-75%.   Depression   Visit Type: follow-up  Patient presents with the following symptoms: excessive worry, fatigue, insomnia, irritability and nervousness/anxiety.  Patient is not experiencing: chest pain, compulsions, confusion, decreased concentration, depressed mood, dizziness, dry mouth, feelings of hopelessness, feelings of worthlessness, obsessions, palpitations, panic, psychomotor agitation, psychomotor retardation, restlessness, shortness of breath, suicidal ideas, suicidal planning, thoughts of death, weight gain and weight loss.  Frequency of symptoms: most days   Severity: mild   Compliance with medications:  51-75%  Side effects:  Insomnia  Insomnia   This is a recurrent problem. The current episode started more than 1 month ago. The problem occurs intermittently. The problem has been waxing and waning. Pertinent negatives include no abdominal pain, anorexia, arthralgias, change in bowel habit, chest pain, chills, congestion, coughing, diaphoresis, fatigue, fever, headaches, joint swelling, myalgias, nausea, neck pain, numbness, rash, sore throat, swollen glands, urinary symptoms, vertigo, visual change, vomiting or weakness. The symptoms are aggravated by stress. She has tried rest, sleep and relaxation (atarax, lexapro) for the symptoms. The treatment provided moderate relief.        The following portions of the patient's history were reviewed and updated as appropriate: allergies, current medications, past family history, past medical history, past social history, past surgical history and problem list.    Review of Systems   Constitutional: Positive for irritability. Negative for chills, diaphoresis, fatigue, fever, weight gain and weight loss.   HENT: Negative.  Negative for congestion and sore throat.    Eyes: Negative.    Respiratory: Negative.  Negative for cough and shortness of breath.    Cardiovascular:  Negative.  Negative for chest pain and palpitations.   Gastrointestinal: Negative.  Negative for abdominal pain, anorexia, change in bowel habit, nausea and vomiting.   Endocrine: Negative.    Genitourinary: Negative.    Musculoskeletal: Negative.  Negative for arthralgias, joint swelling, myalgias and neck pain.   Skin: Negative.  Negative for rash.   Allergic/Immunologic: Negative.    Neurological: Negative.  Negative for dizziness, vertigo, weakness, numbness and headaches.   Hematological: Negative.    Psychiatric/Behavioral: Negative for confusion, decreased concentration and suicidal ideas. The patient is nervous/anxious and has insomnia.        Objective   Physical Exam   Constitutional: She is oriented to person, place, and time. She appears well-developed and well-nourished. No distress.   HENT:   Head: Normocephalic and atraumatic.   Cardiovascular: Normal rate, regular rhythm and normal heart sounds.  Exam reveals no gallop and no friction rub.    No murmur heard.  Pulmonary/Chest: Effort normal and breath sounds normal. No respiratory distress. She has no wheezes. She has no rales.   Neurological: She is alert and oriented to person, place, and time.   Skin: Skin is warm and dry. Lesion noted. No abrasion, no bruising, no burn, no ecchymosis, no laceration, no petechiae and no rash noted. She is not diaphoretic. No erythema. No pallor.   Medial aspect of L distal foot, at base of L great toe she has warty , irregular surface lesion, center with darkened areas noted. plamtar wart vs callous noted. No erythema, not itching, not draining   Psychiatric: Her speech is normal and behavior is normal. Judgment and thought content normal. Her mood appears anxious. Her affect is not angry, not blunt, not labile and not inappropriate. She is not actively hallucinating. Cognition and memory are normal.   Appears well kempt, discusses her problems openly, makes eye contact. Denies thoughts of suicide/homicide She is  attentive.   Nursing note and vitals reviewed.      Assessment/Plan   Shelley was seen today for medication problem.    Diagnoses and all orders for this visit:    Anxiety    Mild episode of recurrent major depressive disorder    Insomnia, unspecified type    Plantar wart of left foot  Comments:  vs callous L foot    Other orders  -     desvenlafaxine (PRISTIQ) 50 MG 24 hr tablet; Take 1 tablet by mouth Daily.    she will cont to taper off of lexapro  Cont on atarax prior to sleep  Start on pristiq 50mg qd  Follow up in 4-8 weeks for recheck  She is referred to podiatry for wart vs callous   She is aware and is in agreement to this plan.  All questions and concerns are addressed with understanding noted.

## 2018-05-17 ENCOUNTER — OFFICE VISIT (OUTPATIENT)
Dept: PODIATRY | Facility: CLINIC | Age: 28
End: 2018-05-17

## 2018-05-17 VITALS
SYSTOLIC BLOOD PRESSURE: 125 MMHG | HEART RATE: 80 BPM | WEIGHT: 127 LBS | OXYGEN SATURATION: 100 % | HEIGHT: 63 IN | BODY MASS INDEX: 22.5 KG/M2 | DIASTOLIC BLOOD PRESSURE: 87 MMHG

## 2018-05-17 DIAGNOSIS — M21.619 BUNION: Primary | ICD-10-CM

## 2018-05-17 DIAGNOSIS — M79.672 LEFT FOOT PAIN: ICD-10-CM

## 2018-05-17 DIAGNOSIS — L84 CORNS: ICD-10-CM

## 2018-05-17 PROCEDURE — 99203 OFFICE O/P NEW LOW 30 MIN: CPT | Performed by: PODIATRIST

## 2018-05-17 NOTE — PROGRESS NOTES
Shelley Rico  1990  27 y.o. female   Patient presents today for left foot bunion and callus.      2018  Chief Complaint   Patient presents with   • Left Foot - Bunions, Callouses           History of Present Illness    Shelley Rico is a 27 y.o. female presents for evaluation of left foot pain related to bunion and callus.  She states that this has been an issue for the past several months.  She frequently gets calluses on the inside of her big toe which does cause some sharp and burning pain while ambulating.  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  She states that she does have a bunion deformity of the big toe which seems to be slightly worsening.  She states that she trims the calluses with some relief of symptoms.  She is also tried a variety of foot creams.  She denies any other specific treatment.            Past Medical History:   Diagnosis Date   • Abdominal pain    • Alopecia    • Anxiety    • Contact dermatitis    • Disorder of gallbladder     gallstones    • Dysuria    • Gastroesophageal reflux disease    • Itch of skin    • Kidney stone    • Menometrorrhagia 2017   • Menometrorrhagia    • Pruritic rash     vesicular rash      • Recurrent major depressive disorder, in partial remission 2017   • Right upper quadrant pain    • Screening for respiratory condition    • Tubal pregnancy without intrauterine pregnancy 6/15/2017   • Tuberculosis screening    • Urinary tract infectious disease          Past Surgical History:   Procedure Laterality Date   •  SECTION     • CHOLECYSTECTOMY  2014    and intraoperative cholangiogram., interpreted intraoperatively under fluoroscopy   • INJECTION OF MEDICATION  2014    Depo Medrol(1)   • LAPAROSCOPIC EXPLORATION FOR ECTOPIC PREGNANCY Bilateral 2017    Procedure: LAPAROSCOPIC EXPLORATION FOR ECTOPIC PREGNANCY;  Surgeon: Duy Jaramillo MD;  Location: [facility];  Service:    •  "SALPINGECTOMY Bilateral 6/8/2017    Procedure: SALPINGECTOMY LAPAROSCOPIC;  Surgeon: Duy Jaramillo MD;  Location: [facility];  Service:    • TONSILLECTOMY     • TUBAL ABDOMINAL LIGATION           Family History   Problem Relation Age of Onset   • Hypertension Father    • Cancer Maternal Grandmother    • Heart disease Maternal Grandmother    • Thyroid disease Maternal Grandmother    • Cancer Maternal Grandfather    • Heart disease Maternal Grandfather    • Cancer Paternal Grandfather          Social History     Social History   • Marital status:      Spouse name: N/A   • Number of children: N/A   • Years of education: N/A     Occupational History   • Not on file.     Social History Main Topics   • Smoking status: Former Smoker   • Smokeless tobacco: Never Used   • Alcohol use Yes      Comment: occasionally   • Drug use: No   • Sexual activity: Yes     Birth control/ protection: None     Other Topics Concern   • Not on file     Social History Narrative   • No narrative on file         Current Outpatient Prescriptions   Medication Sig Dispense Refill   • hydrOXYzine (ATARAX) 25 MG tablet Take 1 tablet by mouth 2 (Two) Times a Day As Needed for Anxiety. 90 tablet 5   • desvenlafaxine (PRISTIQ) 50 MG 24 hr tablet Take 1 tablet by mouth Daily. 30 tablet 2     No current facility-administered medications for this visit.          OBJECTIVE    /87   Pulse 80   Ht 160 cm (63\")   Wt 57.6 kg (127 lb)   SpO2 100%   BMI 22.50 kg/m²       Review of Systems   Constitutional: Negative.    HENT: Negative.    Eyes: Negative.    Respiratory: Negative.    Cardiovascular: Negative.    Gastrointestinal: Negative.    Endocrine: Negative.    Genitourinary: Negative.    Musculoskeletal: Negative.    Allergic/Immunologic: Negative.    Neurological: Negative.    Hematological: Negative.    Psychiatric/Behavioral: Negative.          Physical Exam   Constitutional: she appears well-developed and well-nourished.   HEENT: " Normocephalic. Atraumatic  CV: No CP. RRR  Resp: Non-labored respirations  Psychiatric: she has a normal mood and affect. her behavior is normal.         Lower Extremity Exam:  Vascular: DP/PT pulses palpable 2+.   Minimal right foot edema  Foot warm  CFT wnl  Neuro: Protective sensation intact, b/l.  DTRs intact  Integument: No open wounds  Keratosis to medial hallux IPJ, MTPJ  No erythema, scaling  Skin quality normal  Musculoskeletal: LE muscle strength 5/5.   Gait normal  Ankle ROM decreased without pain or crepitus  STJ ROM full without pain or crepitus  1st MTPJ ROM decreased with mild tenderness. Mild HAV, left  +Dorsomedial 1st mtpj eminence. Mild tenderness on palpation.      Left foot radiographs- Normal osseous density. No acute fractures, subluxations or erosions.  Mild hallux abductovalgus          ASSESSMENT AND PLAN    Shelley was seen today for bunions and callouses.    Diagnoses and all orders for this visit:    Bunion  -     XR Foot 3+ View Left    Corns    Left foot pain      -Comprehensive foot and ankle exam performed  -Radiographs ordered and reviewed  -Educated pt on diagnosis, etiology and treatment of hallux abducto valgus.  -Recommend soft, wide toe box accommodative shoe gear.  Discussed that a lace up style athletic shoe would be better for her than a slip on clog due to friction caused by her feet moving around within the shoe during her shift  -Skin hydration for callus control.  Recommend a urea-based product  -Did discuss possible surgical correction in future if symptoms do not improve.  Although the patient has a relatively mild deformity, given her age if she wishes to pursue surgical correction for the bunion deformity I would recommend a Lapidus or more proximal procedure which does have a slightly increased recovery time.  Patient would like to hold off and continue monitoring at this time  -Recheck as needed            This document has been electronically signed by Luis GRAY  JUAN FRANCISCO Heredia on May 18, 2018 2:23 PM     EMR Dragon/Transcription disclaimer:   Much of this encounter note is an electronic transcription/translation of spoken language to printed text. The electronic translation of spoken language may permit erroneous, or at times, nonsensical words or phrases to be inadvertently transcribed; Although I have reviewed the note for such errors, some may still exist.    Luis Heredia DPM  5/18/2018  2:23 PM

## 2018-11-08 ENCOUNTER — OFFICE VISIT (OUTPATIENT)
Dept: FAMILY MEDICINE CLINIC | Facility: CLINIC | Age: 28
End: 2018-11-08

## 2018-11-08 ENCOUNTER — LAB (OUTPATIENT)
Dept: LAB | Facility: OTHER | Age: 28
End: 2018-11-08

## 2018-11-08 VITALS
TEMPERATURE: 98.1 F | BODY MASS INDEX: 22.02 KG/M2 | WEIGHT: 129 LBS | SYSTOLIC BLOOD PRESSURE: 122 MMHG | HEART RATE: 111 BPM | HEIGHT: 64 IN | DIASTOLIC BLOOD PRESSURE: 62 MMHG

## 2018-11-08 DIAGNOSIS — M54.9 CVA TENDERNESS: ICD-10-CM

## 2018-11-08 DIAGNOSIS — M54.9 MID BACK PAIN ON RIGHT SIDE: Primary | ICD-10-CM

## 2018-11-08 DIAGNOSIS — M54.9 MID BACK PAIN ON RIGHT SIDE: ICD-10-CM

## 2018-11-08 DIAGNOSIS — T14.8XXA MUSCLE STRAIN: ICD-10-CM

## 2018-11-08 LAB
BACTERIA UR QL AUTO: ABNORMAL /HPF
BILIRUB UR QL STRIP: NEGATIVE
CLARITY UR: ABNORMAL
COLOR UR: YELLOW
GLUCOSE UR STRIP-MCNC: NEGATIVE MG/DL
HGB UR QL STRIP.AUTO: NEGATIVE
HYALINE CASTS UR QL AUTO: ABNORMAL /LPF
KETONES UR QL STRIP: NEGATIVE
LEUKOCYTE ESTERASE UR QL STRIP.AUTO: NEGATIVE
NITRITE UR QL STRIP: NEGATIVE
PH UR STRIP.AUTO: 5.5 [PH] (ref 5.5–8)
PROT UR QL STRIP: ABNORMAL
RBC # UR: ABNORMAL /HPF
REF LAB TEST METHOD: ABNORMAL
SP GR UR STRIP: 1.01 (ref 1–1.03)
SQUAMOUS #/AREA URNS HPF: ABNORMAL /HPF
UROBILINOGEN UR QL STRIP: ABNORMAL
WBC UR QL AUTO: ABNORMAL /HPF

## 2018-11-08 PROCEDURE — 96372 THER/PROPH/DIAG INJ SC/IM: CPT | Performed by: NURSE PRACTITIONER

## 2018-11-08 PROCEDURE — 87086 URINE CULTURE/COLONY COUNT: CPT | Performed by: NURSE PRACTITIONER

## 2018-11-08 PROCEDURE — 81001 URINALYSIS AUTO W/SCOPE: CPT | Performed by: NURSE PRACTITIONER

## 2018-11-08 PROCEDURE — 99214 OFFICE O/P EST MOD 30 MIN: CPT | Performed by: NURSE PRACTITIONER

## 2018-11-08 RX ORDER — KETOROLAC TROMETHAMINE 30 MG/ML
30 INJECTION, SOLUTION INTRAMUSCULAR; INTRAVENOUS ONCE
Status: COMPLETED | OUTPATIENT
Start: 2018-11-08 | End: 2018-11-08

## 2018-11-08 RX ORDER — KETOROLAC TROMETHAMINE 30 MG/ML
30 INJECTION, SOLUTION INTRAMUSCULAR; INTRAVENOUS EVERY 6 HOURS PRN
Status: DISCONTINUED | OUTPATIENT
Start: 2018-11-08 | End: 2018-11-08

## 2018-11-08 RX ORDER — CYCLOBENZAPRINE HCL 10 MG
10 TABLET ORAL 3 TIMES DAILY PRN
Qty: 30 TABLET | Refills: 0 | OUTPATIENT
Start: 2018-11-08 | End: 2019-07-12

## 2018-11-08 RX ORDER — METHYLPREDNISOLONE ACETATE 40 MG/ML
80 INJECTION, SUSPENSION INTRA-ARTICULAR; INTRALESIONAL; INTRAMUSCULAR; SOFT TISSUE ONCE
Status: COMPLETED | OUTPATIENT
Start: 2018-11-08 | End: 2018-11-08

## 2018-11-08 RX ORDER — TRAMADOL HYDROCHLORIDE 50 MG/1
50 TABLET ORAL EVERY 6 HOURS PRN
Qty: 60 TABLET | Refills: 0 | Status: SHIPPED | OUTPATIENT
Start: 2018-11-08 | End: 2019-01-29 | Stop reason: RX

## 2018-11-08 RX ADMIN — METHYLPREDNISOLONE ACETATE 80 MG: 40 INJECTION, SUSPENSION INTRA-ARTICULAR; INTRALESIONAL; INTRAMUSCULAR; SOFT TISSUE at 14:07

## 2018-11-08 RX ADMIN — KETOROLAC TROMETHAMINE 30 MG: 30 INJECTION, SOLUTION INTRAMUSCULAR; INTRAVENOUS at 14:06

## 2018-11-08 NOTE — PROGRESS NOTES
Subjective   Shelley Rico is a 28 y.o. female. Patient here today with complaints of Back Pain (pulled muscle)  Patient here today with complaints of right sided mid back pain, started yesterday, states she has been working out, exercising. Denies injury however.  Rates pain as 8/10 on pain scale.  Denies bladder or bowel changes, has been taking appropriate 800 every 6 hours when necessary which helps minimally for pain.  Walking, sitting or taking a deep breath increases the pain.  At times the pain will shoot up her right back.  Denies fever and denies neck pain.    Vitals:    11/08/18 1318   BP: 122/62   Pulse: 111   Temp: 98.1 °F (36.7 °C)     Past Medical History:   Diagnosis Date   • Abdominal pain    • Alopecia    • Anxiety    • Contact dermatitis    • Disorder of gallbladder     gallstones    • Dysuria    • Gastroesophageal reflux disease    • Itch of skin    • Kidney stone    • Menometrorrhagia 9/14/2017   • Menometrorrhagia    • Pruritic rash     vesicular rash      • Recurrent major depressive disorder, in partial remission (CMS/[facility]) 9/14/2017   • Right upper quadrant pain    • Screening for respiratory condition    • Tubal pregnancy without intrauterine pregnancy 6/15/2017   • Tuberculosis screening    • Urinary tract infectious disease      Back Pain   This is a new problem. The current episode started yesterday. The problem occurs constantly. The problem is unchanged. The pain is present in the thoracic spine. The quality of the pain is described as aching. The pain does not radiate. The pain is at a severity of 8/10. The pain is moderate. The pain is the same all the time. The symptoms are aggravated by bending, position, standing, sitting, twisting and lying down. Stiffness is present all day. Pertinent negatives include no abdominal pain, bladder incontinence, bowel incontinence, chest pain, dysuria, fever, headaches, leg pain, numbness, paresis, paresthesias, pelvic pain, perianal  numbness, tingling, weakness or weight loss. She has tried NSAIDs, bed rest and ice for the symptoms. The treatment provided mild relief.        The following portions of the patient's history were reviewed and updated as appropriate: allergies, current medications, past family history, past medical history, past social history, past surgical history and problem list.    Review of Systems   Constitutional: Negative.  Negative for fever and weight loss.   HENT: Negative.    Eyes: Negative.    Respiratory: Negative.    Cardiovascular: Negative.  Negative for chest pain.   Gastrointestinal: Negative.  Negative for abdominal pain and bowel incontinence.   Endocrine: Negative.    Genitourinary: Negative.  Negative for bladder incontinence, dysuria and pelvic pain.   Musculoskeletal: Positive for back pain and gait problem.   Skin: Negative.    Allergic/Immunologic: Negative.    Neurological: Negative for tingling, weakness, numbness, headaches and paresthesias.   Hematological: Negative.    Psychiatric/Behavioral: Negative.        Objective   Physical Exam   Constitutional: She is oriented to person, place, and time. She appears well-developed and well-nourished. No distress.   HENT:   Head: Normocephalic and atraumatic.   Cardiovascular: Normal rate, regular rhythm and normal heart sounds.  Exam reveals no gallop and no friction rub.    No murmur heard.  Pulmonary/Chest: Effort normal and breath sounds normal. No respiratory distress. She has no wheezes. She has no rales.   Abdominal: Soft. Bowel sounds are normal. She exhibits no distension and no mass. There is no tenderness. There is CVA tenderness (R sided CVA tenderness ). There is no rebound and no guarding. No hernia.   Musculoskeletal: She exhibits tenderness.        Right shoulder: She exhibits decreased range of motion, tenderness and bony tenderness. She exhibits no swelling, no effusion, no crepitus, no deformity, no laceration, no pain, no spasm, normal  pulse and normal strength.        Arms:  Neurological: She is alert and oriented to person, place, and time.   Skin: Skin is warm and dry. No rash noted. She is not diaphoretic. No erythema. No pallor.   Psychiatric: She has a normal mood and affect. Her behavior is normal.   Nursing note and vitals reviewed.      Assessment/Plan   Shelley was seen today for back pain.    Diagnoses and all orders for this visit:    Mid back pain on right side  -     Urinalysis With Culture If Indicated - Urine, Clean Catch; Future  -     XR Abdomen KUB; Future  -     ketorolac (TORADOL) injection 30 mg; Infuse 1 mL into a venous catheter Every 6 (Six) Hours As Needed for Moderate Pain .  -     methylPREDNISolone acetate (DEPO-medrol) injection 80 mg; Inject 2 mL into the appropriate muscle as directed by prescriber 1 (One) Time.    Muscle strain    CVA tenderness  Comments:  on R    Other orders  -     traMADol (ULTRAM) 50 MG tablet; Take 1 tablet by mouth Every 6 (Six) Hours As Needed for Moderate Pain  or Severe Pain .  -     cyclobenzaprine (FLEXERIL) 10 MG tablet; Take 1 tablet by mouth 3 (Three) Times a Day As Needed for Muscle Spasms.    Jennifer is obtained and reviewed, terminal given when necessary pain as above, advised no NSAIDs for the rest of today, given Toradol and Depo-Medrol as above in office, will obtain UA and KUB and inform her of results, advised ice/heat to area of pain and follow up here in one week for recheck or sooner if needed to urgent care.  I will take her off of work for this weekend and she is to return here prior to going back to work after that.  Patient aware and in agreement to this plan.  Also advised not working or driving on muscle relaxer and is given Flexeril when necessary as above.  All questions and concerns are addressed with understanding noted. JENNIFER query complete. Treatment plan to include limited course of prescribed controlled substance. Risks including addiction, benefits, and  alternatives presented to patient.

## 2018-11-09 LAB — BACTERIA SPEC AEROBE CULT: NORMAL

## 2019-01-14 ENCOUNTER — OFFICE VISIT (OUTPATIENT)
Dept: FAMILY MEDICINE CLINIC | Facility: CLINIC | Age: 29
End: 2019-01-14

## 2019-01-14 VITALS
OXYGEN SATURATION: 99 % | HEIGHT: 64 IN | HEART RATE: 72 BPM | DIASTOLIC BLOOD PRESSURE: 60 MMHG | TEMPERATURE: 98 F | BODY MASS INDEX: 22.23 KG/M2 | WEIGHT: 130.2 LBS | SYSTOLIC BLOOD PRESSURE: 102 MMHG

## 2019-01-14 DIAGNOSIS — R30.0 DYSURIA: Primary | ICD-10-CM

## 2019-01-14 DIAGNOSIS — R35.0 INCREASED URINARY FREQUENCY: ICD-10-CM

## 2019-01-14 LAB
BILIRUB UR QL STRIP: NEGATIVE
CLARITY UR: ABNORMAL
COLOR UR: YELLOW
GLUCOSE UR STRIP-MCNC: NEGATIVE MG/DL
HGB UR QL STRIP.AUTO: NEGATIVE
KETONES UR QL STRIP: NEGATIVE
LEUKOCYTE ESTERASE UR QL STRIP.AUTO: NEGATIVE
NITRITE UR QL STRIP: NEGATIVE
PH UR STRIP.AUTO: 7 [PH] (ref 5.5–8)
PROT UR QL STRIP: NEGATIVE
SP GR UR STRIP: 1.02 (ref 1–1.03)
UROBILINOGEN UR QL STRIP: ABNORMAL

## 2019-01-14 PROCEDURE — 81003 URINALYSIS AUTO W/O SCOPE: CPT | Performed by: NURSE PRACTITIONER

## 2019-01-14 PROCEDURE — 99213 OFFICE O/P EST LOW 20 MIN: CPT | Performed by: NURSE PRACTITIONER

## 2019-01-14 RX ORDER — NITROFURANTOIN 25; 75 MG/1; MG/1
100 CAPSULE ORAL 2 TIMES DAILY
Qty: 14 CAPSULE | Refills: 0 | Status: SHIPPED | OUTPATIENT
Start: 2019-01-14 | End: 2019-01-21

## 2019-01-14 NOTE — PROGRESS NOTES
Subjective   Shelley Rico is a 28 y.o. female. Patient here today with complaints of painful urination (started Saturday, has been taking azo, is having lower back pain, urgency, frequency and nausea)  pt here today with complaints of dysuria, lower abd and mid back pain, symptoms started Friday, reports mild hematuria too, worsening symptoms. Denies fever. Has had frequency, burning, nausea, denies chills, vomiting. Has been taking AZO and drinking cranberry juice which has helped somewhat.     Vitals:    01/14/19 1027   BP: 102/60   Pulse: 72   Temp: 98 °F (36.7 °C)   SpO2: 99%     Past Medical History:   Diagnosis Date   • Abdominal pain    • Alopecia    • Anxiety    • Contact dermatitis    • Disorder of gallbladder     gallstones    • Dysuria    • Gastroesophageal reflux disease    • Itch of skin    • Kidney stone    • Menometrorrhagia 9/14/2017   • Menometrorrhagia    • Pruritic rash     vesicular rash      • Recurrent major depressive disorder, in partial remission (CMS/[facility]) 9/14/2017   • Right upper quadrant pain    • Screening for respiratory condition    • Tubal pregnancy without intrauterine pregnancy 6/15/2017   • Tuberculosis screening    • Urinary tract infectious disease      Difficulty Urinating   This is a new problem. The current episode started in the past 7 days. The problem occurs constantly. The problem has been gradually worsening. Associated symptoms include abdominal pain, nausea and urinary symptoms. Pertinent negatives include no anorexia, arthralgias, change in bowel habit, chest pain, chills, congestion, coughing, diaphoresis, fatigue, fever, headaches, joint swelling, myalgias, neck pain, numbness, rash, sore throat, swollen glands, vertigo, visual change, vomiting or weakness. Nothing aggravates the symptoms. Treatments tried: AZO , cranberry juice. The treatment provided mild relief.        The following portions of the patient's history were reviewed and updated as  appropriate: allergies, current medications, past family history, past medical history, past social history, past surgical history and problem list.    Review of Systems   Constitutional: Negative.  Negative for chills, diaphoresis, fatigue and fever.   HENT: Negative.  Negative for congestion and sore throat.    Eyes: Negative.    Respiratory: Negative.  Negative for cough.    Cardiovascular: Negative.  Negative for chest pain.   Gastrointestinal: Positive for abdominal pain and nausea. Negative for anorexia, change in bowel habit and vomiting.   Endocrine: Negative.    Genitourinary: Positive for difficulty urinating, dysuria, flank pain, frequency and hematuria.   Musculoskeletal: Negative for arthralgias, joint swelling, myalgias and neck pain.   Skin: Negative.  Negative for rash.   Allergic/Immunologic: Negative.    Neurological: Negative.  Negative for vertigo, weakness, numbness and headaches.   Hematological: Negative.    Psychiatric/Behavioral: Negative.        Objective   Physical Exam   Constitutional: She is oriented to person, place, and time. She appears well-developed and well-nourished. No distress.   HENT:   Head: Normocephalic and atraumatic.   Cardiovascular: Normal rate, regular rhythm and normal heart sounds. Exam reveals no gallop and no friction rub.   No murmur heard.  Pulmonary/Chest: Effort normal and breath sounds normal. No stridor. No respiratory distress. She has no wheezes. She has no rales.   Abdominal: Soft. Bowel sounds are normal. She exhibits no distension and no mass. There is tenderness. There is CVA tenderness. There is no rigidity, no rebound and no guarding. No hernia.   Neurological: She is alert and oriented to person, place, and time.   Skin: Skin is warm and dry. No rash noted. She is not diaphoretic. No erythema. No pallor.   Psychiatric: She has a normal mood and affect. Her behavior is normal.   Nursing note and vitals reviewed.      Assessment/Plan   Shelely was  seen today for painful urination.    Diagnoses and all orders for this visit:    Dysuria    Increased urinary frequency    Other orders  -     nitrofurantoin, macrocrystal-monohydrate, (MACROBID) 100 MG capsule; Take 1 capsule by mouth 2 (Two) Times a Day for 7 days.    UA obtained, advised to continue with AZO and cranberry juice, she is given macrobid as above  If symptoms persist or worsen she is asked to RTC for recheck  Does not need work excuse today  She is aware and is in agreement to this plan  All questions and concerns are addressed with understanding noted.

## 2019-01-29 ENCOUNTER — LAB (OUTPATIENT)
Dept: LAB | Facility: OTHER | Age: 29
End: 2019-01-29

## 2019-01-29 ENCOUNTER — OFFICE VISIT (OUTPATIENT)
Dept: FAMILY MEDICINE CLINIC | Facility: CLINIC | Age: 29
End: 2019-01-29

## 2019-01-29 VITALS
SYSTOLIC BLOOD PRESSURE: 110 MMHG | BODY MASS INDEX: 21.58 KG/M2 | WEIGHT: 126.4 LBS | HEIGHT: 64 IN | HEART RATE: 78 BPM | DIASTOLIC BLOOD PRESSURE: 68 MMHG | TEMPERATURE: 98.2 F

## 2019-01-29 DIAGNOSIS — R30.0 DYSURIA: ICD-10-CM

## 2019-01-29 DIAGNOSIS — N39.0 RECURRENT UTI: ICD-10-CM

## 2019-01-29 DIAGNOSIS — R30.0 DYSURIA: Primary | ICD-10-CM

## 2019-01-29 DIAGNOSIS — N89.8 VAGINAL ITCHING: ICD-10-CM

## 2019-01-29 LAB
BACTERIA UR QL AUTO: ABNORMAL /HPF
BILIRUB UR QL STRIP: NEGATIVE
CLARITY UR: ABNORMAL
COLOR UR: YELLOW
GLUCOSE UR STRIP-MCNC: NEGATIVE MG/DL
HGB UR QL STRIP.AUTO: NEGATIVE
HYALINE CASTS UR QL AUTO: ABNORMAL /LPF
KETONES UR QL STRIP: NEGATIVE
LEUKOCYTE ESTERASE UR QL STRIP.AUTO: ABNORMAL
NITRITE UR QL STRIP: NEGATIVE
PH UR STRIP.AUTO: <=5 [PH] (ref 5.5–8)
PROT UR QL STRIP: NEGATIVE
RBC # UR: ABNORMAL /HPF
REF LAB TEST METHOD: ABNORMAL
SP GR UR STRIP: >=1.03 (ref 1–1.03)
SQUAMOUS #/AREA URNS HPF: ABNORMAL /HPF
UROBILINOGEN UR QL STRIP: ABNORMAL
WBC UR QL AUTO: ABNORMAL /HPF

## 2019-01-29 PROCEDURE — 81001 URINALYSIS AUTO W/SCOPE: CPT | Performed by: NURSE PRACTITIONER

## 2019-01-29 PROCEDURE — 87086 URINE CULTURE/COLONY COUNT: CPT | Performed by: NURSE PRACTITIONER

## 2019-01-29 PROCEDURE — 99213 OFFICE O/P EST LOW 20 MIN: CPT | Performed by: NURSE PRACTITIONER

## 2019-01-29 RX ORDER — FLUCONAZOLE 150 MG/1
150 TABLET ORAL ONCE
Qty: 1 TABLET | Refills: 1 | Status: SHIPPED | OUTPATIENT
Start: 2019-01-29 | End: 2019-01-29

## 2019-01-29 NOTE — PROGRESS NOTES
Subjective   Shelley Rico is a 28 y.o. female. Patient here today with complaints of burning with urination (and pain after urination)  pt here today with complaints again of dysuria, lower abd discomfort after voiding, denies fever. Was treated with macrobid approx 2 weeks ago for dysuria, similar complaints as today. States has seen urology in the past with similar complaints, dr jules. Complaining of vaginal itching, mild . Denies pain with intercourse, reports one sexual partner, denies known exposure to STD, denies vaginal odor, discharge, symptoms she states improved after macrobid but quickly restarted after stopping medicine.     Vitals:    01/29/19 0919   BP: 110/68   Pulse: 78   Temp: 98.2 °F (36.8 °C)     Past Medical History:   Diagnosis Date   • Abdominal pain    • Alopecia    • Anxiety    • Contact dermatitis    • Disorder of gallbladder     gallstones    • Dysuria    • Gastroesophageal reflux disease    • Itch of skin    • Kidney stone    • Menometrorrhagia 9/14/2017   • Menometrorrhagia    • Pruritic rash     vesicular rash      • Recurrent major depressive disorder, in partial remission (CMS/[facility]) 9/14/2017   • Right upper quadrant pain    • Screening for respiratory condition    • Tubal pregnancy without intrauterine pregnancy 6/15/2017   • Tuberculosis screening    • Urinary tract infectious disease      Difficulty Urinating   This is a recurrent problem. The current episode started more than 1 month ago. The problem occurs intermittently. The problem has been waxing and waning. Associated symptoms include abdominal pain and urinary symptoms. Pertinent negatives include no fever. Nothing aggravates the symptoms. She has tried rest (antibiotics, fluids) for the symptoms. The treatment provided mild relief.        The following portions of the patient's history were reviewed and updated as appropriate: allergies, current medications, past family history, past medical history, past social  history, past surgical history and problem list.    Review of Systems   Constitutional: Negative.  Negative for fever.   HENT: Negative.    Eyes: Negative.    Respiratory: Negative.    Cardiovascular: Negative.    Gastrointestinal: Positive for abdominal pain.   Endocrine: Negative.    Genitourinary: Positive for difficulty urinating, dysuria, frequency, pelvic pain and urgency. Negative for menstrual problem, vaginal bleeding, vaginal discharge and vaginal pain.   Musculoskeletal: Negative.    Skin: Negative.    Allergic/Immunologic: Negative.    Neurological: Negative.    Hematological: Negative.    Psychiatric/Behavioral: Negative.        Objective   Physical Exam   Constitutional: She is oriented to person, place, and time. She appears well-developed and well-nourished. No distress.   HENT:   Head: Normocephalic and atraumatic.   Cardiovascular: Normal rate, regular rhythm and normal heart sounds. Exam reveals no gallop and no friction rub.   No murmur heard.  Pulmonary/Chest: Effort normal and breath sounds normal. No stridor. No respiratory distress. She has no wheezes. She has no rales.   Abdominal: Soft. Bowel sounds are normal. She exhibits no distension and no mass. There is no tenderness. There is no rigidity, no rebound, no guarding, no tenderness at McBurney's point and negative Huang's sign. No hernia.       Encircled area is where pt reports having sharp pain off and on however no pain on light or deep palpation today on exam, bs positive    Neurological: She is alert and oriented to person, place, and time.   Skin: Skin is warm and dry. No rash noted. She is not diaphoretic. No erythema. No pallor.   Psychiatric: She has a normal mood and affect. Her behavior is normal.   Nursing note and vitals reviewed.      Assessment/Plan   Shelley was seen today for burning with urination.    Diagnoses and all orders for this visit:    Dysuria  -     Ambulatory Referral to Urology    Recurrent UTI  -      Ambulatory Referral to Urology    Vaginal itching    Other orders  -     fluconazole (DIFLUCAN) 150 MG tablet; Take 1 tablet by mouth 1 (One) Time for 1 dose.    UA and cx obtained today, pt informed of UA results, awaiting cx results  Advised referral to urology again, dr jules, whom she has seen in the past with similar complaints, years ago, due to recurrent symptomatolgy  She is given diflucan and advised to return here if all testing neg per urology and if symptoms persist for gyn exam / referral if needed  Pt aware and is in agreement to this plan   All questions and concerns are addressed with understanding noted.

## 2019-01-31 LAB — BACTERIA SPEC AEROBE CULT: NORMAL

## 2019-07-17 ENCOUNTER — OFFICE VISIT (OUTPATIENT)
Dept: OBSTETRICS AND GYNECOLOGY | Facility: CLINIC | Age: 29
End: 2019-07-17

## 2019-07-17 VITALS
DIASTOLIC BLOOD PRESSURE: 70 MMHG | BODY MASS INDEX: 22.11 KG/M2 | WEIGHT: 124.8 LBS | SYSTOLIC BLOOD PRESSURE: 104 MMHG | HEIGHT: 63 IN

## 2019-07-17 DIAGNOSIS — N93.9 ABNORMAL UTERINE BLEEDING (AUB): ICD-10-CM

## 2019-07-17 DIAGNOSIS — Z11.3 SCREEN FOR STD (SEXUALLY TRANSMITTED DISEASE): ICD-10-CM

## 2019-07-17 DIAGNOSIS — R30.0 DYSURIA: ICD-10-CM

## 2019-07-17 DIAGNOSIS — R10.2 PELVIC PAIN: ICD-10-CM

## 2019-07-17 DIAGNOSIS — Z01.419 ENCOUNTER FOR ANNUAL ROUTINE GYNECOLOGICAL EXAMINATION: Primary | ICD-10-CM

## 2019-07-17 PROCEDURE — 87624 HPV HI-RISK TYP POOLED RSLT: CPT | Performed by: OBSTETRICS & GYNECOLOGY

## 2019-07-17 PROCEDURE — 87661 TRICHOMONAS VAGINALIS AMPLIF: CPT | Performed by: OBSTETRICS & GYNECOLOGY

## 2019-07-17 PROCEDURE — 87491 CHLMYD TRACH DNA AMP PROBE: CPT | Performed by: OBSTETRICS & GYNECOLOGY

## 2019-07-17 PROCEDURE — 99395 PREV VISIT EST AGE 18-39: CPT | Performed by: OBSTETRICS & GYNECOLOGY

## 2019-07-17 PROCEDURE — G0123 SCREEN CERV/VAG THIN LAYER: HCPCS | Performed by: OBSTETRICS & GYNECOLOGY

## 2019-07-17 PROCEDURE — 87591 N.GONORRHOEAE DNA AMP PROB: CPT | Performed by: OBSTETRICS & GYNECOLOGY

## 2019-07-17 RX ORDER — CIPROFLOXACIN 500 MG/1
500 TABLET, FILM COATED ORAL 2 TIMES DAILY
Qty: 6 TABLET | Refills: 0 | Status: SHIPPED | OUTPATIENT
Start: 2019-07-17 | End: 2019-07-20

## 2019-07-17 NOTE — PROGRESS NOTES
Shelley Rico is a 28 y.o. y/o female.     Chief Complaint: Heavy vaginal bleeding, pelvic pain, burning with urination.    HPI:   28 y.o. .  Patient's last menstrual period was 2019..  Patient presents for evaluation of abnormal uterine bleeding stating that her periods, sometimes twice a month with very heavy bleeding for the first 3 to 4 days passing clots.  Also has severe pelvic pain along with this during her periods.  States that this is been ongoing for the last 1 to 2 years but seems to be getting worse at this time.  Patient states that she has tried hormonal treatments in the past which have not helped her bleeding.  She is here requesting definitive management via surgery.  She states that her bleeding is significantly affecting her life requiring her to sometimes change close at work when the bleeding gets too heavy.  She also states that she is having pain and burning with urination similar to UTIs that she has gotten in the past.  States that she is recently finished a course of steroids secondary to a poison ivy infection and normally after steroid she gets UTIs.  Patient states that this feels very similar to previous UTIs.  Patient also states in the past that she has had pain on her right side and problems with cysts on the right ovaries.  I explained that we could consider right oophorectomy at the time of surgery but would not recommend removing both ovaries.  Patient felt comfortable with this plan.     Review of Systems   Constitutional: Negative for chills, fatigue and fever.   HENT: Negative for sore throat.    Eyes: Negative for visual disturbance.   Respiratory: Negative for cough, shortness of breath and wheezing.    Cardiovascular: Negative for chest pain, palpitations and leg swelling.   Gastrointestinal: Negative for abdominal pain, diarrhea, nausea and vomiting.   Genitourinary: Positive for dysuria, menstrual problem, pelvic pain, vaginal bleeding and vaginal  pain. Negative for flank pain, frequency and vaginal discharge.   Neurological: Negative for syncope, light-headedness and headaches.   Psychiatric/Behavioral: Negative for dysphoric mood and suicidal ideas. The patient is not nervous/anxious.         The following portions of the patient's history were reviewed and updated as appropriate: allergies, current medications, past family history, past medical history, past social history, past surgical history and problem list.    Allergies   Allergen Reactions   • Bactrim [Sulfamethoxazole-Trimethoprim] Hives   • Morphine And Related Shortness Of Breath   • Wellbutrin [Bupropion] Rash   • Penicillins Other (See Comments)        Outpatient Medications Prior to Visit   Medication Sig Dispense Refill   • clotrimazole-betamethasone (LOTRISONE) 1-0.05 % cream Apply  topically to the appropriate area as directed 2 (Two) Times a Day. 45 g 0   • hydrOXYzine (ATARAX) 25 MG tablet Take 1 tablet by mouth 2 (Two) Times a Day As Needed for Anxiety. 90 tablet 5   • predniSONE (DELTASONE) 20 MG tablet Take 1 tablet by mouth 2 (Two) Times a Day. 10 tablet 0     No facility-administered medications prior to visit.         The patient has a family history of   Family History   Problem Relation Age of Onset   • Hypertension Father    • Cancer Maternal Grandmother    • Heart disease Maternal Grandmother    • Thyroid disease Maternal Grandmother    • Cancer Maternal Grandfather    • Heart disease Maternal Grandfather    • Cancer Paternal Grandfather         Past Medical History:   Diagnosis Date   • Abdominal pain    • Alopecia    • Anxiety    • Contact dermatitis    • Disorder of gallbladder     gallstones    • Dysuria    • Gastroesophageal reflux disease    • Itch of skin    • Kidney stone    • Menometrorrhagia 9/14/2017   • Menometrorrhagia    • Pruritic rash     vesicular rash      • Recurrent major depressive disorder, in partial remission (CMS/[facility]) 9/14/2017   • Right upper quadrant  "pain    • Screening for respiratory condition    • Tubal pregnancy without intrauterine pregnancy 6/15/2017   • Tuberculosis screening    • Urinary tract infectious disease         OB History      Para Term  AB Living    3 3 3     3    SAB TAB Ectopic Molar Multiple Live Births                          Social History     Socioeconomic History   • Marital status:      Spouse name: Not on file   • Number of children: Not on file   • Years of education: Not on file   • Highest education level: Not on file   Tobacco Use   • Smoking status: Former Smoker   • Smokeless tobacco: Never Used   Substance and Sexual Activity   • Alcohol use: Yes     Comment: occasionally   • Drug use: No   • Sexual activity: Yes     Birth control/protection: None        Past Surgical History:   Procedure Laterality Date   •  SECTION     • CHOLECYSTECTOMY  2014    and intraoperative cholangiogram., interpreted intraoperatively under fluoroscopy   • INJECTION OF MEDICATION  2014    Depo Medrol(1)   • LAPAROSCOPIC EXPLORATION FOR ECTOPIC PREGNANCY Bilateral 2017    Procedure: LAPAROSCOPIC EXPLORATION FOR ECTOPIC PREGNANCY;  Surgeon: Duy Jaramillo MD;  Location: [facility];  Service:    • SALPINGECTOMY Bilateral 2017    Procedure: SALPINGECTOMY LAPAROSCOPIC;  Surgeon: Duy Jaramillo MD;  Location: [facility];  Service:    • TONSILLECTOMY     • TUBAL ABDOMINAL LIGATION          Patient Active Problem List   Diagnosis   • Menometrorrhagia   • Recurrent major depressive disorder, in partial remission (CMS/HCC)   • Anxiety   • Pelvic pain        Documented Vitals    19 0816   BP: 104/70   Weight: 56.6 kg (124 lb 12.8 oz)   Height: 160 cm (63\")        Body mass index is 22.11 kg/m².    Physical Exam   Constitutional: She is oriented to person, place, and time. She appears well-developed and well-nourished. No distress.   HENT:   Head: Normocephalic.   Cardiovascular: Normal rate, regular rhythm, " normal heart sounds and intact distal pulses.   No murmur heard.  Pulmonary/Chest: Effort normal and breath sounds normal. No respiratory distress. She has no wheezes.   Abdominal: Soft. Bowel sounds are normal. She exhibits no distension and no mass. There is no tenderness. There is no rebound and no guarding.   Genitourinary: Vagina normal and uterus normal. No vaginal discharge found.   Genitourinary Comments: Normal-appearing cervix and vagina Pap smear and GC collected without difficulty.  Slightly larger than normal cervix.  Bimanual exam with no masses palpated although the uterus felt slightly enlarged proximally 8 weeks size.  Patient was tender to palpation over the fundus of the uterus, no adnexal masses palpated.  Moderate descent noted patient would be a good candidate for LAVH or TLH.   Musculoskeletal: Normal range of motion. She exhibits no edema, tenderness or deformity.   Neurological: She is alert and oriented to person, place, and time.   Skin: Skin is warm and dry. No erythema.   Psychiatric: She has a normal mood and affect. Her behavior is normal. Judgment and thought content normal.   Vitals reviewed.        Assessment   Plan: Patient with abnormal uterine bleeding possibly secondary to adenomyosis.  Patient desiring definitive management via hysterectomy as hormonal therapy has not worked in the past.  Patient also with pelvic pain more on the right than the left will consider right oopherectomy at time of hysterectomy.  Patient also with dysuria consistent with cystitis plan to treat with ciprofloxacin.  Pap and GC obtained today plan to have patient get pelvic ultrasound to further evaluate for any further uterine masses.  Patient to follow-up in 3 weeks to review results and to schedule surgery will determine a date at that time and finalize route of surgery.  Patient to follow-up sooner as needed.     Diagnosis Plan   1. Encounter for annual routine gynecological examination   Liquid-based Pap Smear, Screening   2. Screen for STD (sexually transmitted disease)  Chlamydia trachomatis, Neisseria gonorrhoeae, Trichomonas vaginalis, PCR - Swab, Cervix   3. Abnormal uterine bleeding (AUB)     4. Pelvic pain  US Non-ob Transvaginal   5. Dysuria           Plan         New Medications Ordered This Visit   Medications   • ciprofloxacin (CIPRO) 500 MG tablet     Sig: Take 0.5 tablets by mouth 2 (Two) Times a Day for 3 days.     Dispense:  9 tablet     Refill:  0             This document has been electronically signed by Rony Mariee MD on July 17, 2019 9:00 AM

## 2019-07-18 LAB
C TRACH RRNA CVX QL NAA+PROBE: NEGATIVE
N GONORRHOEA RRNA SPEC QL NAA+PROBE: NEGATIVE
TRICHOMONAS VAGINALIS PCR: NEGATIVE

## 2019-07-23 LAB
GEN CATEG CVX/VAG CYTO-IMP: NORMAL
LAB AP CASE REPORT: NORMAL
LAB AP GYN ADDITIONAL INFORMATION: NORMAL
PATH INTERP SPEC-IMP: NORMAL
STAT OF ADQ CVX/VAG CYTO-IMP: NORMAL

## 2019-07-25 LAB — HPV I/H RISK 4 DNA CVX QL PROBE+SIG AMP: NEGATIVE

## 2019-08-29 ENCOUNTER — OFFICE VISIT (OUTPATIENT)
Dept: OBSTETRICS AND GYNECOLOGY | Facility: CLINIC | Age: 29
End: 2019-08-29

## 2019-08-29 VITALS
DIASTOLIC BLOOD PRESSURE: 70 MMHG | BODY MASS INDEX: 22.75 KG/M2 | WEIGHT: 128.4 LBS | SYSTOLIC BLOOD PRESSURE: 102 MMHG | HEIGHT: 63 IN

## 2019-08-29 DIAGNOSIS — R10.2 PELVIC PAIN: ICD-10-CM

## 2019-08-29 DIAGNOSIS — N92.1 MENOMETRORRHAGIA: Primary | Chronic | ICD-10-CM

## 2019-08-29 PROCEDURE — 99213 OFFICE O/P EST LOW 20 MIN: CPT | Performed by: OBSTETRICS & GYNECOLOGY

## 2019-08-29 RX ORDER — SODIUM CHLORIDE 0.9 % (FLUSH) 0.9 %
10 SYRINGE (ML) INJECTION AS NEEDED
Status: CANCELLED | OUTPATIENT
Start: 2019-10-14

## 2019-08-29 RX ORDER — SODIUM CHLORIDE 0.9 % (FLUSH) 0.9 %
3 SYRINGE (ML) INJECTION EVERY 12 HOURS SCHEDULED
Status: CANCELLED | OUTPATIENT
Start: 2019-10-14

## 2019-08-29 NOTE — PROGRESS NOTES
Shelley Rico is a 28 y.o. y/o female.     Chief Complaint: Follow-up on pelvic pain and abnormal uterine bleeding.    HPI:   28 y.o. .  Patient's last menstrual period was 2019..  Patient here for follow-up to review ultrasound and to discuss treatment options.  Reviewed pelvic ultrasound which was overall normal.  Patient states that she continues to have heavy bleeding and pelvic pain.  Patient has tried hormonal therapies in the past and has not received any relief.  Now is desiring definitive management via hysterectomy.  Discussed treatment and recommended that patient keep both ovaries at time of hysterectomy.  Patient has had tubes removed at prior surgery.  Patient has had 2 vaginal deliveries and 1 .  Plan for patient to undergo total laparoscopic hysterectomy versus laparoscopic-assisted vaginal hysterectomy on .     Review of Systems   Constitutional: Negative for chills, fatigue and fever.   HENT: Negative for sore throat.    Eyes: Negative for visual disturbance.   Respiratory: Negative for cough, shortness of breath and wheezing.    Cardiovascular: Negative for chest pain, palpitations and leg swelling.   Gastrointestinal: Negative for abdominal pain, diarrhea, nausea and vomiting.   Genitourinary: Positive for menstrual problem, pelvic pain and vaginal bleeding. Negative for dysuria, flank pain, frequency, vaginal discharge and vaginal pain.   Neurological: Negative for syncope, light-headedness and headaches.   Psychiatric/Behavioral: Negative for dysphoric mood and suicidal ideas. The patient is not nervous/anxious.         The following portions of the patient's history were reviewed and updated as appropriate: allergies, current medications, past family history, past medical history, past social history, past surgical history and problem list.    Allergies   Allergen Reactions   • Bactrim [Sulfamethoxazole-Trimethoprim] Hives   • Morphine And Related  Shortness Of Breath   • Wellbutrin [Bupropion] Rash   • Penicillins Other (See Comments)        Prior to Admission medications    Medication Sig Start Date End Date Taking? Authorizing Provider   clotrimazole-betamethasone (LOTRISONE) 1-0.05 % cream Apply  topically to the appropriate area as directed 2 (Two) Times a Day. 19  Yes Lizabeth Miranda PA-C   hydrOXYzine (ATARAX) 25 MG tablet Take 1 tablet by mouth 2 (Two) Times a Day As Needed for Anxiety. 17  Yes Mable Villanueva APRN        The patient has a family history of   Family History   Problem Relation Age of Onset   • Hypertension Father    • Cancer Maternal Grandmother    • Heart disease Maternal Grandmother    • Thyroid disease Maternal Grandmother    • Cancer Maternal Grandfather    • Heart disease Maternal Grandfather    • Cancer Paternal Grandfather         Past Medical History:   Diagnosis Date   • Abdominal pain    • Alopecia    • Anxiety    • Contact dermatitis    • Disorder of gallbladder     gallstones    • Dysuria    • Gastroesophageal reflux disease    • Itch of skin    • Kidney stone    • Menometrorrhagia 2017   • Menometrorrhagia    • Pruritic rash     vesicular rash      • Recurrent major depressive disorder, in partial remission (CMS/[facility]) 2017   • Right upper quadrant pain    • Screening for respiratory condition    • Tubal pregnancy without intrauterine pregnancy 6/15/2017   • Tuberculosis screening    • Urinary tract infectious disease         OB History      Para Term  AB Living    3 3 3     3    SAB TAB Ectopic Molar Multiple Live Births                          Social History     Socioeconomic History   • Marital status:      Spouse name: Not on file   • Number of children: Not on file   • Years of education: Not on file   • Highest education level: Not on file   Tobacco Use   • Smoking status: Former Smoker   • Smokeless tobacco: Never Used   Substance and Sexual Activity   • Alcohol use: Yes      "Comment: occasionally   • Drug use: No   • Sexual activity: Yes     Birth control/protection: None        Past Surgical History:   Procedure Laterality Date   •  SECTION     • CHOLECYSTECTOMY  2014    and intraoperative cholangiogram., interpreted intraoperatively under fluoroscopy   • INJECTION OF MEDICATION  2014    Depo Medrol(1)   • LAPAROSCOPIC EXPLORATION FOR ECTOPIC PREGNANCY Bilateral 2017    Procedure: LAPAROSCOPIC EXPLORATION FOR ECTOPIC PREGNANCY;  Surgeon: Duy Jaramillo MD;  Location: [facility];  Service:    • SALPINGECTOMY Bilateral 2017    Procedure: SALPINGECTOMY LAPAROSCOPIC;  Surgeon: Duy Jaramillo MD;  Location: [facility];  Service:    • TONSILLECTOMY     • TUBAL ABDOMINAL LIGATION          Patient Active Problem List   Diagnosis   • Menometrorrhagia   • Recurrent major depressive disorder, in partial remission (CMS/HCC)   • Anxiety   • Pelvic pain        Documented Vitals    19 1013   BP: 102/70   Weight: 58.2 kg (128 lb 6.4 oz)   Height: 160 cm (63\")        Body mass index is 22.75 kg/m².    Physical Exam   Constitutional: She is oriented to person, place, and time. She appears well-developed and well-nourished. No distress.   Abdominal: Soft.   Small Pfannenstiel incision noted.   Musculoskeletal: Normal range of motion.   Neurological: She is alert and oriented to person, place, and time.   Skin: Skin is warm and dry. She is not diaphoretic.   Psychiatric: She has a normal mood and affect. Her behavior is normal. Judgment and thought content normal.   Vitals reviewed.      Laboratory Data:   No results for input(s): GLUCOSE, BUN, CREATININE, NA, K, CL, CO2, CALCIUM, PROTEINTOT, ALBUMIN, ALT, AST, ALKPHOS, BILITOT, EGFRIFNONA, GLOB, AGRATIO, BCR, ANIONGAP, BILIDIR, INDBILI in the last 49774 hours.  No results for input(s): WBC, RBC, HGB, HCT, MCV, MCH, MCHC, RDW, RDWSD, MPV, PLT in the last 17869 hours.  No results for input(s): HCGQUAL in the last 15661 " hours.    Assessment   Plan for patient to undergo hysterectomy on 14 October.  Patient to return in 3 weeks for preop.  Patient has tried hormonal therapies in the past and did not get relief of pelvic pain or bleeding.  Now desiring definitive management.  Patient to follow-up sooner as needed.  Recent Pap smear was normal, given her age endometrial biopsy is not needed.     Diagnosis Plan   1. Menometrorrhagia     2. Pelvic pain           Plan       No orders of the defined types were placed in this encounter.            This document has been electronically signed by Rony Mariee DO on August 29, 2019 6:09 PM

## 2019-09-26 ENCOUNTER — OFFICE VISIT (OUTPATIENT)
Dept: OBSTETRICS AND GYNECOLOGY | Facility: CLINIC | Age: 29
End: 2019-09-26

## 2019-09-26 ENCOUNTER — APPOINTMENT (OUTPATIENT)
Dept: PREADMISSION TESTING | Facility: HOSPITAL | Age: 29
End: 2019-09-26

## 2019-09-26 VITALS
DIASTOLIC BLOOD PRESSURE: 65 MMHG | HEART RATE: 64 BPM | SYSTOLIC BLOOD PRESSURE: 99 MMHG | WEIGHT: 127 LBS | RESPIRATION RATE: 16 BRPM | HEIGHT: 63 IN | BODY MASS INDEX: 22.5 KG/M2 | OXYGEN SATURATION: 100 %

## 2019-09-26 VITALS
SYSTOLIC BLOOD PRESSURE: 99 MMHG | DIASTOLIC BLOOD PRESSURE: 65 MMHG | WEIGHT: 127 LBS | BODY MASS INDEX: 22.5 KG/M2 | HEIGHT: 63 IN

## 2019-09-26 DIAGNOSIS — N92.1 MENOMETRORRHAGIA: Chronic | ICD-10-CM

## 2019-09-26 DIAGNOSIS — R10.2 PELVIC PAIN: Primary | ICD-10-CM

## 2019-09-26 LAB
ABO GROUP BLD: NORMAL
ANION GAP SERPL CALCULATED.3IONS-SCNC: 13 MMOL/L (ref 5–15)
BASOPHILS # BLD AUTO: 0.03 10*3/MM3 (ref 0–0.2)
BASOPHILS NFR BLD AUTO: 0.5 % (ref 0–1.5)
BLD GP AB SCN SERPL QL: NEGATIVE
BUN BLD-MCNC: 9 MG/DL (ref 6–20)
BUN/CREAT SERPL: 14.8 (ref 7–25)
CALCIUM SPEC-SCNC: 9.2 MG/DL (ref 8.6–10.5)
CHLORIDE SERPL-SCNC: 102 MMOL/L (ref 98–107)
CO2 SERPL-SCNC: 25 MMOL/L (ref 22–29)
CREAT BLD-MCNC: 0.61 MG/DL (ref 0.57–1)
DEPRECATED RDW RBC AUTO: 42.6 FL (ref 37–54)
EOSINOPHIL # BLD AUTO: 0.13 10*3/MM3 (ref 0–0.4)
EOSINOPHIL NFR BLD AUTO: 2.4 % (ref 0.3–6.2)
ERYTHROCYTE [DISTWIDTH] IN BLOOD BY AUTOMATED COUNT: 12.5 % (ref 12.3–15.4)
GFR SERPL CREATININE-BSD FRML MDRD: 114 ML/MIN/1.73
GLUCOSE BLD-MCNC: 89 MG/DL (ref 65–99)
HCT VFR BLD AUTO: 36 % (ref 34–46.6)
HGB BLD-MCNC: 12.2 G/DL (ref 12–15.9)
IMM GRANULOCYTES # BLD AUTO: 0.01 10*3/MM3 (ref 0–0.05)
IMM GRANULOCYTES NFR BLD AUTO: 0.2 % (ref 0–0.5)
LYMPHOCYTES # BLD AUTO: 2.06 10*3/MM3 (ref 0.7–3.1)
LYMPHOCYTES NFR BLD AUTO: 37.4 % (ref 19.6–45.3)
Lab: NORMAL
MCH RBC QN AUTO: 31.5 PG (ref 26.6–33)
MCHC RBC AUTO-ENTMCNC: 33.9 G/DL (ref 31.5–35.7)
MCV RBC AUTO: 93 FL (ref 79–97)
MONOCYTES # BLD AUTO: 0.47 10*3/MM3 (ref 0.1–0.9)
MONOCYTES NFR BLD AUTO: 8.5 % (ref 5–12)
NEUTROPHILS # BLD AUTO: 2.81 10*3/MM3 (ref 1.7–7)
NEUTROPHILS NFR BLD AUTO: 51 % (ref 42.7–76)
NRBC BLD AUTO-RTO: 0 /100 WBC (ref 0–0.2)
PLATELET # BLD AUTO: 226 10*3/MM3 (ref 140–450)
PMV BLD AUTO: 10.3 FL (ref 6–12)
POTASSIUM BLD-SCNC: 4.2 MMOL/L (ref 3.5–5.2)
RBC # BLD AUTO: 3.87 10*6/MM3 (ref 3.77–5.28)
RH BLD: POSITIVE
SODIUM BLD-SCNC: 140 MMOL/L (ref 136–145)
T&S EXPIRATION DATE: NORMAL
WBC NRBC COR # BLD: 5.51 10*3/MM3 (ref 3.4–10.8)

## 2019-09-26 PROCEDURE — 86850 RBC ANTIBODY SCREEN: CPT | Performed by: ANESTHESIOLOGY

## 2019-09-26 PROCEDURE — 36415 COLL VENOUS BLD VENIPUNCTURE: CPT

## 2019-09-26 PROCEDURE — 99213 OFFICE O/P EST LOW 20 MIN: CPT | Performed by: OBSTETRICS & GYNECOLOGY

## 2019-09-26 PROCEDURE — 85025 COMPLETE CBC W/AUTO DIFF WBC: CPT | Performed by: OBSTETRICS & GYNECOLOGY

## 2019-09-26 PROCEDURE — 86900 BLOOD TYPING SEROLOGIC ABO: CPT | Performed by: ANESTHESIOLOGY

## 2019-09-26 PROCEDURE — 86901 BLOOD TYPING SEROLOGIC RH(D): CPT | Performed by: ANESTHESIOLOGY

## 2019-09-26 PROCEDURE — 80048 BASIC METABOLIC PNL TOTAL CA: CPT | Performed by: OBSTETRICS & GYNECOLOGY

## 2019-09-26 RX ORDER — SODIUM CHLORIDE, SODIUM GLUCONATE, SODIUM ACETATE, POTASSIUM CHLORIDE, AND MAGNESIUM CHLORIDE 526; 502; 368; 37; 30 MG/100ML; MG/100ML; MG/100ML; MG/100ML; MG/100ML
1000 INJECTION, SOLUTION INTRAVENOUS CONTINUOUS
Status: CANCELLED | OUTPATIENT
Start: 2019-10-14

## 2019-09-26 RX ORDER — HYDROXYZINE HYDROCHLORIDE 25 MG/1
25 TABLET, FILM COATED ORAL 3 TIMES DAILY PRN
COMMUNITY
End: 2020-01-28 | Stop reason: SDUPTHER

## 2019-09-26 RX ORDER — CLOTRIMAZOLE AND BETAMETHASONE DIPROPIONATE 10; .64 MG/G; MG/G
1 CREAM TOPICAL 2 TIMES DAILY PRN
COMMUNITY
End: 2019-12-12

## 2019-09-26 NOTE — PROGRESS NOTES
Shelley Rico is a 29 y.o. y/o female.     Chief Complaint: Preop for laparoscopic hysterectomy.    HPI:   29 y.o. .  Patient's last menstrual period was 2019..  Patient presents for preop evaluation prior to upper scopic hysterectomy on .  No concerns or questions at this time.  No changes to patient's health history.    Patient requests laparoscopic hysterectomy. She understands the risk up to and including death. She understands the risk of serious urologic morbidity such as vesico vaginal fistula, damage to the ureter and or bladder with possible need for additional surgery and low possibility of nephrectomy and/or extended morbidity. She understands the risk of damage to bowel possible colostomy. She understands the risk of damage to bowel undetected at time of procedure with sepsis and/or need returned OR.  I discussed the risk of bleeding with the patient and possible risk of blood transfusion.  Blood products carry risk of HIV, infection, hepatitis, and transfusion reaction. Patient is willing to accept blood products. She understands the risk of delayed hemorrhage with possible need to return to the OR. She understands the risk of hematoma formation. She understands the risk of infection in the abdominal wall, pelvis, abdomen and other parts of the body. She understands the alternatives of medical therapy and the risks and alternatives. Her questions are answered at length. She understands that there is a  possibility that we may need to convert this to a total abdominal hysterectomy and she accepts this. Patient expressed understanding and desires to proceed with surgery.    Note from previous visit: Patient here for follow-up to review ultrasound and to discuss treatment options.  Reviewed pelvic ultrasound which was overall normal.  Patient states that she continues to have heavy bleeding and pelvic pain.  Patient has tried hormonal therapies in the past and has not received  any relief.  Now is desiring definitive management via hysterectomy.  Discussed treatment and recommended that patient keep both ovaries at time of hysterectomy.  Patient has had tubes removed at prior surgery.  Patient has had 2 vaginal deliveries and 1 .  Plan for patient to undergo total laparoscopic hysterectomy versus laparoscopic-assisted vaginal hysterectomy on .     Review of Systems   Constitutional: Negative for chills, fatigue and fever.   HENT: Negative for sore throat.    Eyes: Negative for visual disturbance.   Respiratory: Negative for cough, shortness of breath and wheezing.    Cardiovascular: Negative for chest pain, palpitations and leg swelling.   Gastrointestinal: Negative for abdominal pain, diarrhea, nausea and vomiting.   Genitourinary: Positive for pelvic pain. Negative for dysuria, flank pain, frequency, vaginal bleeding, vaginal discharge and vaginal pain.   Neurological: Negative for syncope, light-headedness and headaches.   Psychiatric/Behavioral: Negative for dysphoric mood and suicidal ideas. The patient is not nervous/anxious.         The following portions of the patient's history were reviewed and updated as appropriate: allergies, current medications, past family history, past medical history, past social history, past surgical history and problem list.    Allergies   Allergen Reactions   • Bactrim [Sulfamethoxazole-Trimethoprim] Hives   • Morphine And Related Shortness Of Breath   • Wellbutrin [Bupropion] Rash   • Penicillins Other (See Comments)        Prior to Admission medications    Medication Sig Start Date End Date Taking? Authorizing Provider   clotrimazole-betamethasone (LOTRISONE) 1-0.05 % cream Apply  topically to the appropriate area as directed 2 (Two) Times a Day. 19  Yes Lizabeth Miranda, PAGERHARD   hydrOXYzine (ATARAX) 25 MG tablet Take 1 tablet by mouth 2 (Two) Times a Day As Needed for Anxiety. 17  Yes Mable Villanueva APRN        The  patient has a family history of   Family History   Problem Relation Age of Onset   • Hypertension Father    • Cancer Maternal Grandmother    • Heart disease Maternal Grandmother    • Thyroid disease Maternal Grandmother    • Cancer Maternal Grandfather    • Heart disease Maternal Grandfather    • Cancer Paternal Grandfather         Past Medical History:   Diagnosis Date   • Abdominal pain    • Alopecia    • Anxiety    • Contact dermatitis    • Disorder of gallbladder     gallstones    • Dysuria    • Gastroesophageal reflux disease    • Itch of skin    • Kidney stone    • Menometrorrhagia 2017   • Menometrorrhagia    • Pruritic rash     vesicular rash      • Recurrent major depressive disorder, in partial remission (CMS/HCC) 2017   • Right upper quadrant pain    • Screening for respiratory condition    • Tubal pregnancy without intrauterine pregnancy 6/15/2017   • Tuberculosis screening    • Urinary tract infectious disease         OB History      Para Term  AB Living    3 3 3     3    SAB TAB Ectopic Molar Multiple Live Births                          Social History     Socioeconomic History   • Marital status:      Spouse name: Not on file   • Number of children: Not on file   • Years of education: Not on file   • Highest education level: Not on file   Tobacco Use   • Smoking status: Former Smoker   • Smokeless tobacco: Never Used   Substance and Sexual Activity   • Alcohol use: Yes     Comment: occasionally   • Drug use: No   • Sexual activity: Yes     Birth control/protection: None        Past Surgical History:   Procedure Laterality Date   •  SECTION     • CHOLECYSTECTOMY  2014    and intraoperative cholangiogram., interpreted intraoperatively under fluoroscopy   • INJECTION OF MEDICATION  2014    Depo Medrol(1)   • LAPAROSCOPIC EXPLORATION FOR ECTOPIC PREGNANCY Bilateral 2017    Procedure: LAPAROSCOPIC EXPLORATION FOR ECTOPIC PREGNANCY;  Surgeon: Duy ARRIAGA  "MD Ella;  Location: [facility];  Service:    • SALPINGECTOMY Bilateral 6/8/2017    Procedure: SALPINGECTOMY LAPAROSCOPIC;  Surgeon: Duy Jaramillo MD;  Location: [facility];  Service:    • TONSILLECTOMY     • TUBAL ABDOMINAL LIGATION          Patient Active Problem List   Diagnosis   • Menometrorrhagia   • Recurrent major depressive disorder, in partial remission (CMS/HCC)   • Anxiety   • Pelvic pain        Documented Vitals    09/26/19 0824   BP: 99/65   Weight: 57.6 kg (127 lb)   Height: 160 cm (63\")   PainSc: 0-No pain        Body mass index is 22.5 kg/m².    Physical Exam   Constitutional: She is oriented to person, place, and time. She appears well-developed and well-nourished. No distress.   HENT:   Head: Normocephalic.   Neck: No thyromegaly present.   Cardiovascular: Normal rate, regular rhythm, normal heart sounds and intact distal pulses.   No murmur heard.  Pulmonary/Chest: Effort normal and breath sounds normal. No respiratory distress. She has no wheezes.   Abdominal: Soft. Bowel sounds are normal. She exhibits no distension and no mass. There is no tenderness. There is no rebound and no guarding.   Musculoskeletal: Normal range of motion. She exhibits no edema, tenderness or deformity.   Neurological: She is alert and oriented to person, place, and time.   Skin: Skin is warm and dry. No erythema.   Psychiatric: She has a normal mood and affect. Her behavior is normal. Judgment and thought content normal.   Vitals reviewed.      Assessment   Patient with pelvic pain unrelieved by medical management, now desiring definitive management via hysterectomy.  Plan for patient to undergo laparoscopic hysterectomy on 14 October.  Same-day surgery appointment today.  Patient to follow-up as needed.     Diagnosis Plan   1. Pelvic pain     2. Menometrorrhagia             This document has been electronically signed by Rony Mariee DO on September 26, 2019 8:40 AM  "

## 2019-10-13 ENCOUNTER — ANESTHESIA EVENT (OUTPATIENT)
Dept: PERIOP | Facility: HOSPITAL | Age: 29
End: 2019-10-13

## 2019-10-14 ENCOUNTER — ANESTHESIA (OUTPATIENT)
Dept: PERIOP | Facility: HOSPITAL | Age: 29
End: 2019-10-14

## 2019-10-14 ENCOUNTER — HOSPITAL ENCOUNTER (OUTPATIENT)
Facility: HOSPITAL | Age: 29
Discharge: HOME | End: 2019-10-15
Attending: OBSTETRICS & GYNECOLOGY | Admitting: OBSTETRICS & GYNECOLOGY

## 2019-10-14 DIAGNOSIS — N92.1 MENOMETRORRHAGIA: ICD-10-CM

## 2019-10-14 LAB
ABO GROUP BLD: NORMAL
B-HCG UR QL: NEGATIVE
BASOPHILS # BLD AUTO: 0.01 10*3/MM3 (ref 0–0.2)
BASOPHILS NFR BLD AUTO: 0.1 % (ref 0–1.5)
BLD GP AB SCN SERPL QL: NEGATIVE
DEPRECATED RDW RBC AUTO: 42.7 FL (ref 37–54)
EOSINOPHIL # BLD AUTO: 0 10*3/MM3 (ref 0–0.4)
EOSINOPHIL NFR BLD AUTO: 0 % (ref 0.3–6.2)
ERYTHROCYTE [DISTWIDTH] IN BLOOD BY AUTOMATED COUNT: 12.2 % (ref 12.3–15.4)
HCT VFR BLD AUTO: 33 % (ref 34–46.6)
HGB BLD-MCNC: 10.7 G/DL (ref 12–15.9)
IMM GRANULOCYTES # BLD AUTO: 0.03 10*3/MM3 (ref 0–0.05)
IMM GRANULOCYTES NFR BLD AUTO: 0.3 % (ref 0–0.5)
LYMPHOCYTES # BLD AUTO: 0.82 10*3/MM3 (ref 0.7–3.1)
LYMPHOCYTES NFR BLD AUTO: 7.1 % (ref 19.6–45.3)
Lab: NORMAL
MCH RBC QN AUTO: 31.2 PG (ref 26.6–33)
MCHC RBC AUTO-ENTMCNC: 32.4 G/DL (ref 31.5–35.7)
MCV RBC AUTO: 96.2 FL (ref 79–97)
MONOCYTES # BLD AUTO: 0.53 10*3/MM3 (ref 0.1–0.9)
MONOCYTES NFR BLD AUTO: 4.6 % (ref 5–12)
NEUTROPHILS # BLD AUTO: 10.23 10*3/MM3 (ref 1.7–7)
NEUTROPHILS NFR BLD AUTO: 87.9 % (ref 42.7–76)
NRBC BLD AUTO-RTO: 0 /100 WBC (ref 0–0.2)
PLAT MORPH BLD: NORMAL
PLATELET # BLD AUTO: 227 10*3/MM3 (ref 140–450)
PMV BLD AUTO: 10.7 FL (ref 6–12)
RBC # BLD AUTO: 3.43 10*6/MM3 (ref 3.77–5.28)
RBC MORPH BLD: NORMAL
RH BLD: POSITIVE
T&S EXPIRATION DATE: NORMAL
WBC MORPH BLD: NORMAL
WBC NRBC COR # BLD: 11.62 10*3/MM3 (ref 3.4–10.8)

## 2019-10-14 PROCEDURE — 94760 N-INVAS EAR/PLS OXIMETRY 1: CPT

## 2019-10-14 PROCEDURE — C1889 IMPLANT/INSERT DEVICE, NOC: HCPCS | Performed by: OBSTETRICS & GYNECOLOGY

## 2019-10-14 PROCEDURE — 25010000002 CEFAZOLIN PER 500 MG: Performed by: OBSTETRICS & GYNECOLOGY

## 2019-10-14 PROCEDURE — 86850 RBC ANTIBODY SCREEN: CPT | Performed by: OBSTETRICS & GYNECOLOGY

## 2019-10-14 PROCEDURE — 85007 BL SMEAR W/DIFF WBC COUNT: CPT | Performed by: OBSTETRICS & GYNECOLOGY

## 2019-10-14 PROCEDURE — 25010000002 PROPOFOL 10 MG/ML EMULSION: Performed by: NURSE ANESTHETIST, CERTIFIED REGISTERED

## 2019-10-14 PROCEDURE — 0: Performed by: OBSTETRICS & GYNECOLOGY

## 2019-10-14 PROCEDURE — 25010000002 SUCCINYLCHOLINE PER 20 MG: Performed by: NURSE ANESTHETIST, CERTIFIED REGISTERED

## 2019-10-14 PROCEDURE — 25010000002 PROMETHAZINE PER 50 MG: Performed by: OBSTETRICS & GYNECOLOGY

## 2019-10-14 PROCEDURE — 94799 UNLISTED PULMONARY SVC/PX: CPT

## 2019-10-14 PROCEDURE — 25010000002 DEXAMETHASONE PER 1 MG: Performed by: NURSE ANESTHETIST, CERTIFIED REGISTERED

## 2019-10-14 PROCEDURE — 25010000002 MIDAZOLAM PER 1 MG: Performed by: NURSE ANESTHETIST, CERTIFIED REGISTERED

## 2019-10-14 PROCEDURE — 25010000002 FENTANYL CITRATE (PF) 100 MCG/2ML SOLUTION: Performed by: NURSE ANESTHETIST, CERTIFIED REGISTERED

## 2019-10-14 PROCEDURE — 86900 BLOOD TYPING SEROLOGIC ABO: CPT | Performed by: OBSTETRICS & GYNECOLOGY

## 2019-10-14 PROCEDURE — 86901 BLOOD TYPING SEROLOGIC RH(D): CPT | Performed by: OBSTETRICS & GYNECOLOGY

## 2019-10-14 PROCEDURE — 81025 URINE PREGNANCY TEST: CPT | Performed by: OBSTETRICS & GYNECOLOGY

## 2019-10-14 PROCEDURE — 25010000002 HYDROMORPHONE PER 4 MG: Performed by: NURSE ANESTHETIST, CERTIFIED REGISTERED

## 2019-10-14 PROCEDURE — 58552 LAPARO-VAG HYST INCL T/O: CPT | Performed by: OBSTETRICS & GYNECOLOGY

## 2019-10-14 PROCEDURE — 51702 INSERT TEMP BLADDER CATH: CPT

## 2019-10-14 PROCEDURE — 85025 COMPLETE CBC W/AUTO DIFF WBC: CPT | Performed by: OBSTETRICS & GYNECOLOGY

## 2019-10-14 PROCEDURE — 25010000002 ONDANSETRON PER 1 MG: Performed by: NURSE ANESTHETIST, CERTIFIED REGISTERED

## 2019-10-14 PROCEDURE — 25010000002 ONDANSETRON PER 1 MG: Performed by: OBSTETRICS & GYNECOLOGY

## 2019-10-14 PROCEDURE — 25010000002 NEOSTIGMINE 4 MG/4ML SOLUTION PREFILLED SYRINGE: Performed by: NURSE ANESTHETIST, CERTIFIED REGISTERED

## 2019-10-14 PROCEDURE — 88307 TISSUE EXAM BY PATHOLOGIST: CPT | Performed by: OBSTETRICS & GYNECOLOGY

## 2019-10-14 PROCEDURE — 88307 TISSUE EXAM BY PATHOLOGIST: CPT | Performed by: PATHOLOGY

## 2019-10-14 DEVICE — ABSORBABLE RELOAD
Type: IMPLANTABLE DEVICE | Status: FUNCTIONAL
Brand: V-LOC 180

## 2019-10-14 RX ORDER — ONDANSETRON 2 MG/ML
INJECTION INTRAMUSCULAR; INTRAVENOUS AS NEEDED
Status: DISCONTINUED | OUTPATIENT
Start: 2019-10-14 | End: 2019-10-14 | Stop reason: SURG

## 2019-10-14 RX ORDER — ACETAMINOPHEN 500 MG
1000 TABLET ORAL EVERY 8 HOURS
Status: DISCONTINUED | OUTPATIENT
Start: 2019-10-14 | End: 2019-10-15 | Stop reason: HOSPADM

## 2019-10-14 RX ORDER — SODIUM CHLORIDE, SODIUM LACTATE, POTASSIUM CHLORIDE, CALCIUM CHLORIDE 600; 310; 30; 20 MG/100ML; MG/100ML; MG/100ML; MG/100ML
100 INJECTION, SOLUTION INTRAVENOUS CONTINUOUS
Status: DISCONTINUED | OUTPATIENT
Start: 2019-10-14 | End: 2019-10-15 | Stop reason: HOSPADM

## 2019-10-14 RX ORDER — FENTANYL CITRATE 50 UG/ML
INJECTION, SOLUTION INTRAMUSCULAR; INTRAVENOUS AS NEEDED
Status: DISCONTINUED | OUTPATIENT
Start: 2019-10-14 | End: 2019-10-14 | Stop reason: SURG

## 2019-10-14 RX ORDER — SODIUM CHLORIDE, SODIUM GLUCONATE, SODIUM ACETATE, POTASSIUM CHLORIDE, AND MAGNESIUM CHLORIDE 526; 502; 368; 37; 30 MG/100ML; MG/100ML; MG/100ML; MG/100ML; MG/100ML
INJECTION, SOLUTION INTRAVENOUS CONTINUOUS PRN
Status: DISCONTINUED | OUTPATIENT
Start: 2019-10-14 | End: 2019-10-14 | Stop reason: SURG

## 2019-10-14 RX ORDER — BUPIVACAINE HCL/0.9 % NACL/PF 0.1 %
2 PLASTIC BAG, INJECTION (ML) EPIDURAL ONCE
Status: COMPLETED | OUTPATIENT
Start: 2019-10-14 | End: 2019-10-14

## 2019-10-14 RX ORDER — FLUMAZENIL 0.1 MG/ML
0.2 INJECTION INTRAVENOUS AS NEEDED
Status: DISCONTINUED | OUTPATIENT
Start: 2019-10-14 | End: 2019-10-14 | Stop reason: HOSPADM

## 2019-10-14 RX ORDER — LABETALOL HYDROCHLORIDE 5 MG/ML
5 INJECTION, SOLUTION INTRAVENOUS
Status: DISCONTINUED | OUTPATIENT
Start: 2019-10-14 | End: 2019-10-14 | Stop reason: HOSPADM

## 2019-10-14 RX ORDER — OXYCODONE HYDROCHLORIDE AND ACETAMINOPHEN 5; 325 MG/1; MG/1
1 TABLET ORAL EVERY 4 HOURS PRN
Status: DISCONTINUED | OUTPATIENT
Start: 2019-10-14 | End: 2019-10-15 | Stop reason: HOSPADM

## 2019-10-14 RX ORDER — MIDAZOLAM HYDROCHLORIDE 1 MG/ML
INJECTION INTRAMUSCULAR; INTRAVENOUS AS NEEDED
Status: DISCONTINUED | OUTPATIENT
Start: 2019-10-14 | End: 2019-10-14 | Stop reason: SURG

## 2019-10-14 RX ORDER — ONDANSETRON 4 MG/1
4 TABLET, FILM COATED ORAL EVERY 6 HOURS PRN
Status: DISCONTINUED | OUTPATIENT
Start: 2019-10-14 | End: 2019-10-15 | Stop reason: HOSPADM

## 2019-10-14 RX ORDER — LIDOCAINE HYDROCHLORIDE 20 MG/ML
INJECTION, SOLUTION INFILTRATION; PERINEURAL AS NEEDED
Status: DISCONTINUED | OUTPATIENT
Start: 2019-10-14 | End: 2019-10-14 | Stop reason: SURG

## 2019-10-14 RX ORDER — DIPHENHYDRAMINE HYDROCHLORIDE 50 MG/ML
12.5 INJECTION INTRAMUSCULAR; INTRAVENOUS
Status: DISCONTINUED | OUTPATIENT
Start: 2019-10-14 | End: 2019-10-14 | Stop reason: HOSPADM

## 2019-10-14 RX ORDER — NALOXONE HCL 0.4 MG/ML
0.4 VIAL (ML) INJECTION AS NEEDED
Status: DISCONTINUED | OUTPATIENT
Start: 2019-10-14 | End: 2019-10-14 | Stop reason: HOSPADM

## 2019-10-14 RX ORDER — ONDANSETRON 2 MG/ML
4 INJECTION INTRAMUSCULAR; INTRAVENOUS EVERY 6 HOURS PRN
Status: DISCONTINUED | OUTPATIENT
Start: 2019-10-14 | End: 2019-10-15 | Stop reason: HOSPADM

## 2019-10-14 RX ORDER — ROCURONIUM BROMIDE 10 MG/ML
INJECTION, SOLUTION INTRAVENOUS AS NEEDED
Status: DISCONTINUED | OUTPATIENT
Start: 2019-10-14 | End: 2019-10-14 | Stop reason: SURG

## 2019-10-14 RX ORDER — PROMETHAZINE HYDROCHLORIDE 25 MG/ML
12.5 INJECTION, SOLUTION INTRAMUSCULAR; INTRAVENOUS ONCE AS NEEDED
Status: DISCONTINUED | OUTPATIENT
Start: 2019-10-14 | End: 2019-10-14 | Stop reason: HOSPADM

## 2019-10-14 RX ORDER — ACETAMINOPHEN 160 MG/5ML
650 SOLUTION ORAL EVERY 4 HOURS PRN
Status: DISCONTINUED | OUTPATIENT
Start: 2019-10-14 | End: 2019-10-15 | Stop reason: HOSPADM

## 2019-10-14 RX ORDER — EPHEDRINE SULFATE 50 MG/ML
5 INJECTION, SOLUTION INTRAVENOUS ONCE AS NEEDED
Status: DISCONTINUED | OUTPATIENT
Start: 2019-10-14 | End: 2019-10-14 | Stop reason: HOSPADM

## 2019-10-14 RX ORDER — DOCUSATE SODIUM 100 MG/1
100 CAPSULE, LIQUID FILLED ORAL 2 TIMES DAILY PRN
Status: DISCONTINUED | OUTPATIENT
Start: 2019-10-14 | End: 2019-10-15 | Stop reason: SDUPTHER

## 2019-10-14 RX ORDER — NEOSTIGMINE METHYLSULFATE 4 MG/4 ML
SYRINGE (ML) INTRAVENOUS AS NEEDED
Status: DISCONTINUED | OUTPATIENT
Start: 2019-10-14 | End: 2019-10-14 | Stop reason: SURG

## 2019-10-14 RX ORDER — PROMETHAZINE HYDROCHLORIDE 25 MG/ML
12.5 INJECTION, SOLUTION INTRAMUSCULAR; INTRAVENOUS EVERY 6 HOURS PRN
Status: DISCONTINUED | OUTPATIENT
Start: 2019-10-14 | End: 2019-10-15 | Stop reason: HOSPADM

## 2019-10-14 RX ORDER — ONDANSETRON 2 MG/ML
4 INJECTION INTRAMUSCULAR; INTRAVENOUS ONCE AS NEEDED
Status: DISCONTINUED | OUTPATIENT
Start: 2019-10-14 | End: 2019-10-14 | Stop reason: HOSPADM

## 2019-10-14 RX ORDER — DEXAMETHASONE SODIUM PHOSPHATE 4 MG/ML
INJECTION, SOLUTION INTRA-ARTICULAR; INTRALESIONAL; INTRAMUSCULAR; INTRAVENOUS; SOFT TISSUE AS NEEDED
Status: DISCONTINUED | OUTPATIENT
Start: 2019-10-14 | End: 2019-10-14 | Stop reason: SURG

## 2019-10-14 RX ORDER — SCOLOPAMINE TRANSDERMAL SYSTEM 1 MG/1
1 PATCH, EXTENDED RELEASE TRANSDERMAL
Status: DISCONTINUED | OUTPATIENT
Start: 2019-10-14 | End: 2019-10-14 | Stop reason: HOSPADM

## 2019-10-14 RX ORDER — PROMETHAZINE HYDROCHLORIDE 25 MG/1
25 SUPPOSITORY RECTAL ONCE AS NEEDED
Status: DISCONTINUED | OUTPATIENT
Start: 2019-10-14 | End: 2019-10-14 | Stop reason: HOSPADM

## 2019-10-14 RX ORDER — HYDROMORPHONE HCL 110MG/55ML
PATIENT CONTROLLED ANALGESIA SYRINGE INTRAVENOUS AS NEEDED
Status: DISCONTINUED | OUTPATIENT
Start: 2019-10-14 | End: 2019-10-14 | Stop reason: SURG

## 2019-10-14 RX ORDER — ACETAMINOPHEN 650 MG/1
650 SUPPOSITORY RECTAL EVERY 4 HOURS PRN
Status: DISCONTINUED | OUTPATIENT
Start: 2019-10-14 | End: 2019-10-15 | Stop reason: HOSPADM

## 2019-10-14 RX ORDER — ACETAMINOPHEN 650 MG/1
650 SUPPOSITORY RECTAL ONCE AS NEEDED
Status: DISCONTINUED | OUTPATIENT
Start: 2019-10-14 | End: 2019-10-14 | Stop reason: HOSPADM

## 2019-10-14 RX ORDER — ACETAMINOPHEN 325 MG/1
650 TABLET ORAL ONCE AS NEEDED
Status: DISCONTINUED | OUTPATIENT
Start: 2019-10-14 | End: 2019-10-14 | Stop reason: HOSPADM

## 2019-10-14 RX ORDER — SUCCINYLCHOLINE CHLORIDE 20 MG/ML
INJECTION INTRAMUSCULAR; INTRAVENOUS AS NEEDED
Status: DISCONTINUED | OUTPATIENT
Start: 2019-10-14 | End: 2019-10-14 | Stop reason: SURG

## 2019-10-14 RX ORDER — SODIUM CHLORIDE, SODIUM GLUCONATE, SODIUM ACETATE, POTASSIUM CHLORIDE, AND MAGNESIUM CHLORIDE 526; 502; 368; 37; 30 MG/100ML; MG/100ML; MG/100ML; MG/100ML; MG/100ML
1000 INJECTION, SOLUTION INTRAVENOUS CONTINUOUS
Status: DISCONTINUED | OUTPATIENT
Start: 2019-10-14 | End: 2019-10-14

## 2019-10-14 RX ORDER — SODIUM CHLORIDE 0.9 % (FLUSH) 0.9 %
10 SYRINGE (ML) INJECTION AS NEEDED
Status: DISCONTINUED | OUTPATIENT
Start: 2019-10-14 | End: 2019-10-14 | Stop reason: HOSPADM

## 2019-10-14 RX ORDER — PROPOFOL 10 MG/ML
VIAL (ML) INTRAVENOUS AS NEEDED
Status: DISCONTINUED | OUTPATIENT
Start: 2019-10-14 | End: 2019-10-14 | Stop reason: SURG

## 2019-10-14 RX ORDER — PROMETHAZINE HYDROCHLORIDE 25 MG/1
25 TABLET ORAL ONCE AS NEEDED
Status: DISCONTINUED | OUTPATIENT
Start: 2019-10-14 | End: 2019-10-14 | Stop reason: HOSPADM

## 2019-10-14 RX ORDER — SODIUM CHLORIDE 0.9 % (FLUSH) 0.9 %
3 SYRINGE (ML) INJECTION EVERY 12 HOURS SCHEDULED
Status: DISCONTINUED | OUTPATIENT
Start: 2019-10-14 | End: 2019-10-14 | Stop reason: HOSPADM

## 2019-10-14 RX ADMIN — LIDOCAINE HYDROCHLORIDE 50 MG: 20 INJECTION, SOLUTION INFILTRATION; PERINEURAL at 11:19

## 2019-10-14 RX ADMIN — HYDROMORPHONE HYDROCHLORIDE 0.5 MG: 2 INJECTION, SOLUTION INTRAMUSCULAR; INTRAVENOUS; SUBCUTANEOUS at 10:34

## 2019-10-14 RX ADMIN — SODIUM CHLORIDE 2 G: 9 INJECTION, SOLUTION INTRAVENOUS at 19:26

## 2019-10-14 RX ADMIN — ROCURONIUM BROMIDE 45 MG: 10 INJECTION INTRAVENOUS at 08:00

## 2019-10-14 RX ADMIN — OXYCODONE HYDROCHLORIDE AND ACETAMINOPHEN 1 TABLET: 5; 325 TABLET ORAL at 14:22

## 2019-10-14 RX ADMIN — SUCCINYLCHOLINE CHLORIDE 100 MG: 20 INJECTION, SOLUTION INTRAMUSCULAR; INTRAVENOUS at 07:50

## 2019-10-14 RX ADMIN — PROPOFOL 50 MG: 10 INJECTION, EMULSION INTRAVENOUS at 11:19

## 2019-10-14 RX ADMIN — ONDANSETRON 4 MG: 2 INJECTION INTRAMUSCULAR; INTRAVENOUS at 11:11

## 2019-10-14 RX ADMIN — ACETAMINOPHEN 1000 MG: 500 TABLET ORAL at 14:12

## 2019-10-14 RX ADMIN — ROCURONIUM BROMIDE 5 MG: 10 INJECTION INTRAVENOUS at 07:50

## 2019-10-14 RX ADMIN — FLUORESCEIN SODIUM 0.25 ML: 100 INJECTION INTRAVENOUS at 10:19

## 2019-10-14 RX ADMIN — PROPOFOL 150 MG: 10 INJECTION, EMULSION INTRAVENOUS at 07:50

## 2019-10-14 RX ADMIN — OXYCODONE HYDROCHLORIDE AND ACETAMINOPHEN 1 TABLET: 5; 325 TABLET ORAL at 18:30

## 2019-10-14 RX ADMIN — DEXAMETHASONE SODIUM PHOSPHATE 4 MG: 4 INJECTION, SOLUTION INTRAMUSCULAR; INTRAVENOUS at 08:12

## 2019-10-14 RX ADMIN — SCOPALAMINE 1 PATCH: 1 PATCH, EXTENDED RELEASE TRANSDERMAL at 07:25

## 2019-10-14 RX ADMIN — ROCURONIUM BROMIDE 5 MG: 10 INJECTION INTRAVENOUS at 10:25

## 2019-10-14 RX ADMIN — MIDAZOLAM HYDROCHLORIDE 2 MG: 2 INJECTION, SOLUTION INTRAMUSCULAR; INTRAVENOUS at 07:42

## 2019-10-14 RX ADMIN — HYDROMORPHONE HYDROCHLORIDE 0.5 MG: 2 INJECTION, SOLUTION INTRAMUSCULAR; INTRAVENOUS; SUBCUTANEOUS at 08:25

## 2019-10-14 RX ADMIN — ONDANSETRON 4 MG: 2 INJECTION INTRAMUSCULAR; INTRAVENOUS at 14:38

## 2019-10-14 RX ADMIN — MEPERIDINE HYDROCHLORIDE 12.5 MG: 25 INJECTION, SOLUTION INTRAMUSCULAR; INTRAVENOUS; SUBCUTANEOUS at 12:11

## 2019-10-14 RX ADMIN — GLYCOPYRROLATE 0.4 MG: 0.2 INJECTION, SOLUTION INTRAMUSCULAR; INTRAVITREAL at 11:15

## 2019-10-14 RX ADMIN — LIDOCAINE HYDROCHLORIDE 50 MG: 20 INJECTION, SOLUTION INFILTRATION; PERINEURAL at 07:50

## 2019-10-14 RX ADMIN — Medication 2 G: at 07:50

## 2019-10-14 RX ADMIN — SODIUM CHLORIDE, POTASSIUM CHLORIDE, SODIUM LACTATE AND CALCIUM CHLORIDE 100 ML/HR: 600; 310; 30; 20 INJECTION, SOLUTION INTRAVENOUS at 12:16

## 2019-10-14 RX ADMIN — Medication 3 MG: at 11:15

## 2019-10-14 RX ADMIN — ACETAMINOPHEN 1000 MG: 500 TABLET ORAL at 22:46

## 2019-10-14 RX ADMIN — FENTANYL CITRATE 100 MCG: 50 INJECTION, SOLUTION INTRAMUSCULAR; INTRAVENOUS at 07:46

## 2019-10-14 RX ADMIN — SODIUM CHLORIDE, POTASSIUM CHLORIDE, SODIUM LACTATE AND CALCIUM CHLORIDE 100 ML/HR: 600; 310; 30; 20 INJECTION, SOLUTION INTRAVENOUS at 20:46

## 2019-10-14 RX ADMIN — PROMETHAZINE HYDROCHLORIDE 12.5 MG: 25 INJECTION INTRAMUSCULAR; INTRAVENOUS at 18:39

## 2019-10-14 RX ADMIN — SODIUM CHLORIDE, SODIUM GLUCONATE, SODIUM ACETATE, POTASSIUM CHLORIDE, AND MAGNESIUM CHLORIDE: 526; 502; 368; 37; 30 INJECTION, SOLUTION INTRAVENOUS at 07:58

## 2019-10-14 RX ADMIN — HYDROMORPHONE HYDROCHLORIDE 0.5 MG: 2 INJECTION, SOLUTION INTRAMUSCULAR; INTRAVENOUS; SUBCUTANEOUS at 08:34

## 2019-10-14 RX ADMIN — SODIUM CHLORIDE, SODIUM GLUCONATE, SODIUM ACETATE, POTASSIUM CHLORIDE, AND MAGNESIUM CHLORIDE 1000 ML: 526; 502; 368; 37; 30 INJECTION, SOLUTION INTRAVENOUS at 06:45

## 2019-10-14 RX ADMIN — SODIUM CHLORIDE, SODIUM GLUCONATE, SODIUM ACETATE, POTASSIUM CHLORIDE, AND MAGNESIUM CHLORIDE: 526; 502; 368; 37; 30 INJECTION, SOLUTION INTRAVENOUS at 07:40

## 2019-10-14 NOTE — ANESTHESIA PREPROCEDURE EVALUATION
Anesthesia Evaluation     Patient summary reviewed and Nursing notes reviewed   history of anesthetic complications: PONV  NPO Solid Status: > 8 hours  NPO Liquid Status: > 8 hours           Airway   Mallampati: II  TM distance: >3 FB  Neck ROM: full  no difficulty expected  Dental - normal exam     Pulmonary - normal exam   Cardiovascular - negative cardio ROS and normal exam  Exercise tolerance: good (4-7 METS)    NYHA Classification: II        Neuro/Psych- negative ROS  GI/Hepatic/Renal/Endo    (-) GERD, no renal disease    ROS Comment: For laparoscopic appendectomy today for acute appendicitis. She has RLQ pain, but no nausea nor vomiting.    Musculoskeletal     Abdominal    Substance History - negative use     OB/GYN    (-)  Pregnant    Comment: Has a positive HCG (469) with no intrauterine gestational sac seen on either US or MRI and she is s/p BTL during her last C/S in 2011.      Other - negative ROS                         Anesthesia Plan    ASA 2     general     intravenous induction   Anesthetic plan, all risks, benefits, and alternatives have been provided, discussed and informed consent has been obtained with: patient and spouse/significant other.

## 2019-10-14 NOTE — ANESTHESIA PROCEDURE NOTES
Peripheral IV    Patient location during procedure: OR  Line placed for Fluids/Medication Admin.  Performed By   CRNA: Gian Albert CRNA  Preanesthetic Checklist  Completed: patient identified, site marked, surgical consent, pre-op evaluation, timeout performed, IV checked, risks and benefits discussed and monitors and equipment checked  Peripheral IV Prep   Patient position: supine   Prep: ChloraPrep  Patient monitoring: heart rate, cardiac monitor and continuous pulse ox  Peripheral IV Procedure   Laterality:left  Location:  Antecubital  Catheter size: 18 G         Post Assessment   Dressing Type: transparent and tape.    IV Dressing/Site: clean, dry and intact

## 2019-10-14 NOTE — ANESTHESIA POSTPROCEDURE EVALUATION
Patient: Shelley Rico    Procedure Summary     Date:  10/14/19 Room / Location:  [facility] OR 09 / [facility] OR    Anesthesia Start:  0743 Anesthesia Stop:  1140    Procedure:  TOTAL LAPAROSCOPIC HYSTERECTOMY, CYSTOSCOPY (N/A Abdomen) Diagnosis:       Menometrorrhagia      (Menometrorrhagia [N92.1])    Surgeon:  Rony Mariee DO Provider:  Boy Chappell MD    Anesthesia Type:  general ASA Status:  2          Anesthesia Type: general  Last vitals  BP   126/73 (10/14/19 0639)   Temp   97.3 °F (36.3 °C) (10/14/19 0639)   Pulse   76 (10/14/19 0639)   Resp   20 (10/14/19 0639)     SpO2   99 % (10/14/19 0639)     Post Anesthesia Care and Evaluation    Patient location during evaluation: PACU  Patient participation: complete - patient participated  Level of consciousness: awake and alert  Pain score: 0  Pain management: adequate  Airway patency: patent  Anesthetic complications: No anesthetic complications  PONV Status: none  Cardiovascular status: acceptable  Respiratory status: acceptable, unassisted, spontaneous ventilation and room air  Hydration status: acceptable

## 2019-10-14 NOTE — OP NOTE
OPERATIVE NOTE  Shelley Rico  1990  10/14/2019    PREOP DIAGNOSES:  Menometrorrhagia [N92.1]    POSTOP DIAGNOSES:  Post-Op Diagnosis Codes:     * Menometrorrhagia [N92.1]    Procedure(s):  TOTAL LAPAROSCOPIC HYSTERECTOMY, CYSTOSCOPY    SURGEON: Rony Mariee DO, FACOG    ASSISTANT: Ai Ba CSA      STAFF:   Circulator: Kanika Garcia RN  Scrub Person: Liliya Mitchell; Tamra Travis  Endo Technician: Jahaira Oliver, CARLOS  Assistant: Ai Ba CSA    ANESTHESIA: Choice    ANESTHESIA STAFF:  Anesthesiologist: Boy Chappell MD  CRNA: Gian Albert CRNA  Student Nurse Anesthetist: Ritchie Powell SRNA    ESTIMATED BLOOD LOSS: 500 ml     FINDINGS: Normal-appearing uterus tubes and ovaries.  Moderate amount of bladder adhesions noted on the anterior portion of the cervix.  Cystoscopy showed E flux from UOs bilaterally.    COMPLICATIONS: None    DESCRIPTION OF OPERATION:   After obtaining informed consent, the patient was taken to the operating room where General anesthesia was found to be adequate.  She was then prepped and draped in the normal sterile fashion in the low lithotomy position. A final time out was performed. Preoperative antibiotics given.    A bivaled speculum was placed into the vagina, and the cervix visualized with findings as noted above. A V-Care uterine manipulator was placed without difficulty. A vergara catheter was then place.         Attention was then turned to the abdomen, the umbilicus was injected with 2cc of 0.5% Marcaine plain. A 5mm incision was then made in the umbilicus. The abdominal wall was elevated and an optiview trocar was used to enter the abdomen. Correct placement was confirmed after placement. The abdomen was insuflated with CO2 gas. A second 5mm robotic trocar was place at the level of the umbilicus on the patient’s left follow by the same thing on the patient’s right. A pelvic survey was then perform. The patient was placed into  trendeleberg position.    The right round ligament was cauterized and transected. A bladder flap on the right was then created the the midline.  The right uterooverian ligament was then cauterized with bipolar energy and transected. The broad ligament on the right was then cauterized with bipolar and transected. The right uterine artery was identified and cauterized with bipolar energy and transected.     Attention was then turned to the left and the left round ligament was cauterized with bipolar energy and transected. The remainder of the bladder flap was created on the left and connected at the mid line. The bladder was then dissected off of the vagina bluntly and with monopolar cautery without difficulty. The left uterooverian ligament was then cauterized with bipolar energy and transected. The broad ligament on the left was then cauterized with bipolar energy and transected. The left uterine artery was identified, cauterized with bipolar energy and transected.    The V-Care cup was then identified and a colpotomy was made in 360 degrees and the uterus and cervix were amuputed. Colpotomy was made with monopolar energy. The uterus and tubes were then removed from the vagina. Hemostasis was noted at the surgical pedicles bilaterally and at the vaginal cuff.  The vaginal cuff was then closed using 2-0 V-lock suture.  A second V lock suture was then used to obtain hemostasis at the left vaginal cuff pedicle.  Vaginal cuff closure was noted to be adequate.  Bleeding was noted on the right just above the IP ligament near the bladder flap.  Bipolar cauterization was used and hemostasis appeared to be achieved.  10 cc of Surgi-Edmond was placed over the area.    Cystoscopy was performed with normal appearing bladder and efflux of fluourescene noted from both UO’s no sutures seen in the bladders.  Attention was again turned to the abdomen to inspect bleeding.  It was noted that patient was still bleeding from likely the  right bladder pedicle.  Bipolar cauterization was performed once the bleeding site was identified.  Inspection again revealed hemostasis.  FloSeal was again placed over the area as much of the previous FloSeal had been removed with irrigation trying to find the bleeding vessel.  As bipolar cautery was performed on the right sidewall repeat cystoscopy was done with E flux noted from the right UO.. A final vaginal sweep was performed. The three abdominal trocar incision were then closed with 4-0 monocryl interrupted and dermabond was place over the incisions.    The patient tolerated the procedure well.  Sponge, lap, and needle counts were correct times two.  The patient was taken to the recovery room in stable condition.    This document has been electronically signed by Rony Mariee DO on October 14, 2019 11:24 AM          This document has been electronically signed by Rony Mariee DO on October 14, 2019 11:21 AM

## 2019-10-14 NOTE — ANESTHESIA PROCEDURE NOTES
Airway  Urgency: elective    Date/Time: 10/14/2019 7:53 AM  Airway not difficult    General Information and Staff    Patient location during procedure: OR  CRNA: Gian Albert CRNA    Indications and Patient Condition  Indications for airway management: airway protection    Preoxygenated: yes  MILS maintained throughout  Mask difficulty assessment: 1 - vent by mask    Final Airway Details  Final airway type: endotracheal airway      Successful airway: ETT    Successful intubation technique: direct laryngoscopy  Facilitating devices/methods: intubating stylet  Endotracheal tube insertion site: oral  Blade: Rahul  Blade size: 3  ETT size (mm): 7.0  Cormack-Lehane Classification: grade I - full view of glottis  Placement verified by: chest auscultation and capnometry   Measured from: teeth  ETT/EBT  to teeth (cm): 20  Number of attempts at approach: 1  Assessment: lips, teeth, and gum same as pre-op and atraumatic intubation

## 2019-10-14 NOTE — PLAN OF CARE
Problem: Patient Care Overview  Goal: Plan of Care Review  Outcome: Ongoing (interventions implemented as appropriate)   10/14/19 5007   Coping/Psychosocial   Plan of Care Reviewed With patient;spouse   OTHER   Outcome Summary VSS; pain controlled with prn pain meds. nausea controlled with prn meds. Labs drawn this pm.      Goal: Individualization and Mutuality  Outcome: Ongoing (interventions implemented as appropriate)    Goal: Discharge Needs Assessment  Outcome: Ongoing (interventions implemented as appropriate)    Goal: Interprofessional Rounds/Family Conf  Outcome: Ongoing (interventions implemented as appropriate)      Problem: Hysterectomy (Adult)  Goal: Signs and Symptoms of Listed Potential Problems Will be Absent, Minimized or Managed (Hysterectomy)  Outcome: Ongoing (interventions implemented as appropriate)    Goal: Anesthesia/Sedation Recovery  Outcome: Ongoing (interventions implemented as appropriate)

## 2019-10-15 VITALS
HEIGHT: 63 IN | TEMPERATURE: 97.9 F | BODY MASS INDEX: 22.3 KG/M2 | DIASTOLIC BLOOD PRESSURE: 54 MMHG | WEIGHT: 125.88 LBS | OXYGEN SATURATION: 99 % | HEART RATE: 77 BPM | RESPIRATION RATE: 18 BRPM | SYSTOLIC BLOOD PRESSURE: 102 MMHG

## 2019-10-15 LAB
BACTERIA UR QL AUTO: ABNORMAL /HPF
BILIRUB UR QL STRIP: NEGATIVE
CLARITY UR: ABNORMAL
COLOR UR: YELLOW
DEPRECATED RDW RBC AUTO: 42.3 FL (ref 37–54)
ERYTHROCYTE [DISTWIDTH] IN BLOOD BY AUTOMATED COUNT: 12.1 % (ref 12.3–15.4)
GLUCOSE UR STRIP-MCNC: NEGATIVE MG/DL
HCT VFR BLD AUTO: 28.4 % (ref 34–46.6)
HGB BLD-MCNC: 9.6 G/DL (ref 12–15.9)
HGB UR QL STRIP.AUTO: ABNORMAL
HYALINE CASTS UR QL AUTO: ABNORMAL /LPF
KETONES UR QL STRIP: NEGATIVE
LAB AP CASE REPORT: NORMAL
LEUKOCYTE ESTERASE UR QL STRIP.AUTO: ABNORMAL
MCH RBC QN AUTO: 31.8 PG (ref 26.6–33)
MCHC RBC AUTO-ENTMCNC: 33.8 G/DL (ref 31.5–35.7)
MCV RBC AUTO: 94 FL (ref 79–97)
NITRITE UR QL STRIP: NEGATIVE
PATH REPORT.FINAL DX SPEC: NORMAL
PATH REPORT.GROSS SPEC: NORMAL
PH UR STRIP.AUTO: 8 [PH] (ref 5–9)
PLATELET # BLD AUTO: 217 10*3/MM3 (ref 140–450)
PMV BLD AUTO: 11 FL (ref 6–12)
PROT UR QL STRIP: NEGATIVE
RBC # BLD AUTO: 3.02 10*6/MM3 (ref 3.77–5.28)
RBC # UR: ABNORMAL /HPF
REF LAB TEST METHOD: ABNORMAL
SP GR UR STRIP: 1.01 (ref 1–1.03)
SQUAMOUS #/AREA URNS HPF: ABNORMAL /HPF
UROBILINOGEN UR QL STRIP: ABNORMAL
WBC NRBC COR # BLD: 9.66 10*3/MM3 (ref 3.4–10.8)
WBC UR QL AUTO: ABNORMAL /HPF

## 2019-10-15 PROCEDURE — 87086 URINE CULTURE/COLONY COUNT: CPT | Performed by: OBSTETRICS & GYNECOLOGY

## 2019-10-15 PROCEDURE — 81001 URINALYSIS AUTO W/SCOPE: CPT | Performed by: OBSTETRICS & GYNECOLOGY

## 2019-10-15 PROCEDURE — 85027 COMPLETE CBC AUTOMATED: CPT | Performed by: OBSTETRICS & GYNECOLOGY

## 2019-10-15 PROCEDURE — 99024 POSTOP FOLLOW-UP VISIT: CPT | Performed by: OBSTETRICS & GYNECOLOGY

## 2019-10-15 RX ORDER — PHENAZOPYRIDINE HYDROCHLORIDE 200 MG/1
200 TABLET, FILM COATED ORAL
Qty: 20 TABLET | Refills: 1 | OUTPATIENT
Start: 2019-10-15 | End: 2019-12-12

## 2019-10-15 RX ORDER — DOCUSATE SODIUM 100 MG/1
100 CAPSULE, LIQUID FILLED ORAL DAILY PRN
Status: DISCONTINUED | OUTPATIENT
Start: 2019-10-15 | End: 2019-10-15 | Stop reason: HOSPADM

## 2019-10-15 RX ORDER — PHENAZOPYRIDINE HYDROCHLORIDE 200 MG/1
200 TABLET, FILM COATED ORAL
Status: DISCONTINUED | OUTPATIENT
Start: 2019-10-15 | End: 2019-10-15 | Stop reason: HOSPADM

## 2019-10-15 RX ORDER — OXYCODONE HYDROCHLORIDE AND ACETAMINOPHEN 5; 325 MG/1; MG/1
1 TABLET ORAL EVERY 4 HOURS PRN
Qty: 18 TABLET | Refills: 0 | Status: SHIPPED | OUTPATIENT
Start: 2019-10-15 | End: 2019-10-24

## 2019-10-15 RX ORDER — ONDANSETRON 4 MG/1
4 TABLET, FILM COATED ORAL EVERY 8 HOURS PRN
Qty: 10 TABLET | Refills: 0 | Status: SHIPPED | OUTPATIENT
Start: 2019-10-15 | End: 2020-01-28

## 2019-10-15 RX ADMIN — OXYCODONE HYDROCHLORIDE AND ACETAMINOPHEN 1 TABLET: 5; 325 TABLET ORAL at 10:25

## 2019-10-15 RX ADMIN — PHENAZOPYRIDINE HYDROCHLORIDE 200 MG: 200 TABLET, FILM COATED ORAL at 11:16

## 2019-10-15 RX ADMIN — SODIUM CHLORIDE, POTASSIUM CHLORIDE, SODIUM LACTATE AND CALCIUM CHLORIDE 100 ML/HR: 600; 310; 30; 20 INJECTION, SOLUTION INTRAVENOUS at 05:34

## 2019-10-15 RX ADMIN — SODIUM CHLORIDE, POTASSIUM CHLORIDE, SODIUM LACTATE AND CALCIUM CHLORIDE 100 ML/HR: 600; 310; 30; 20 INJECTION, SOLUTION INTRAVENOUS at 15:17

## 2019-10-15 RX ADMIN — ACETAMINOPHEN 1000 MG: 500 TABLET ORAL at 14:32

## 2019-10-15 RX ADMIN — ONDANSETRON HYDROCHLORIDE 4 MG: 4 TABLET, FILM COATED ORAL at 17:22

## 2019-10-15 RX ADMIN — OXYCODONE HYDROCHLORIDE AND ACETAMINOPHEN 1 TABLET: 5; 325 TABLET ORAL at 05:31

## 2019-10-15 RX ADMIN — OXYCODONE HYDROCHLORIDE AND ACETAMINOPHEN 1 TABLET: 5; 325 TABLET ORAL at 15:16

## 2019-10-15 RX ADMIN — PHENAZOPYRIDINE HYDROCHLORIDE 200 MG: 200 TABLET, FILM COATED ORAL at 09:14

## 2019-10-15 NOTE — DISCHARGE SUMMARY
[facility]  Shelley Rico  : 1990  MRN: [id number]  CSN: [id number]    Discharge Summary      Date of Admission: 10/14/2019   Date of Discharge: 10/15/2019   Discharge Diagnosis: 1.  Menometrorrhagia status post uncomplicated total laparoscopic hysterectomy.   Procedures Performed: Procedure(s):  TOTAL LAPAROSCOPIC HYSTERECTOMY, CYSTOSCOPY      Brief History: Patient is a 29 y.o.who presented for hysterectomy secondary to abnormal uterine bleeding.  Patient had uncomplicated surgery and was discharged home on postoperative day #1.   Hospital Course: Her hospital course has been uneventful.  Today, on postoperative day # 1, she is tolerating a regular diet, ambulating without assistance, and desires to go home.  Myles catheter was removed this morning, and she has urinated without difficulty. She has had no fever, and her pain is controlled.  She has had no nausea or vomiting.   Pending Studies: Tissue Pathology   Condition at Discharge: Stable   Discharge Diet: Diet Instructions     Diet: Regular      Discharge Diet:  Regular         Discharge Activity: Activity Instructions     Bathing Restrictions      Type of Restriction:  Bathing    Bathing Restrictions:  Other    Explain Bathing Restrictions:  No soaking in bathtub for 4 weeks, showers are fine.    Driving Restrictions      Type of Restriction:  Driving    Driving Restrictions:  No Driving (Time Limited)    Length:  Other    Indicate Length of Restriction:  No driving for 1 week or while on narcotic pain medications. Riding is car is fine.    Lifting Restrictions      Type of Restriction:  Lifting    Lifting Restrictions:  Other    Explain Lifing Restrictions:  No lifting greater than 15 pound until cleared by surgeon.    Pelvic Rest      Nothing in the vagina for 6 weeks to include tampons or intercourse.    Sexual Activity Restrictions      Type of Restriction:  Sex    Explain Sexual Activity Restrictions:  No sexual intercourse for at  least 6 weeks         Discharge Medications:    Your medication list      START taking these medications      Instructions Last Dose Given Next Dose Due   ondansetron 4 MG tablet  Commonly known as:  ZOFRAN      Take 1 tablet by mouth Every 8 (Eight) Hours As Needed for Nausea or Vomiting.       oxyCODONE-acetaminophen 5-325 MG per tablet  Commonly known as:  PERCOCET      Take 1 tablet by mouth Every 4 (Four) Hours As Needed for Moderate Pain  for up to 9 days.       phenazopyridine 200 MG tablet  Commonly known as:  PYRIDIUM      Take 1 tablet by mouth 3 (Three) Times a Day With Meals.          CONTINUE taking these medications      Instructions Last Dose Given Next Dose Due   clotrimazole-betamethasone 1-0.05 % cream  Commonly known as:  LOTRISONE      Apply 1 application topically to the appropriate area as directed 2 (Two) Times a Day As Needed (poison ivy).       hydrOXYzine 25 MG tablet  Commonly known as:  ATARAX      Take 25 mg by mouth 3 (Three) Times a Day As Needed for Itching (poison ivy).             Where to Get Your Medications      These medications were sent to [facility] [place] [id number]    Hours:  Monday through Friday 7:00am to 5:00pm Phone:  [phone] ·   ondansetron 4 MG tablet  · oxyCODONE-acetaminophen 5-325 MG per tablet  · phenazopyridine 200 MG tablet        Discharge Disposition: home   Follow-up: Future Appointments   Date Time Provider Department Center   10/22/2019  8:30 AM Rony Mariee DO [facility] None   10/25/2019  9:30 AM Rony Mariee DO [facility] None            This note has been electronically signed.    Rony Mariee DO  October 15, 2019  5:02 PM

## 2019-10-15 NOTE — PROGRESS NOTES
"[facility]  Shelley Rico  : 1990  MRN: [id number]  CSN: [id number]    Post-operative Day #1  Subjective   Ms. Rico is a 29 year old female who is POD#1 s/p total laproscopic hysterectomy due to persistent menometrorrhagia. She is resting comfortably, and reports having some pain at her incision sites that is worsened by activity. She could not urinate appropriately last night, and a catheter was placed. She said it felt like she had to urinate, but her bladder \"was not abdi.\" She has not had a bowel movement, and is not passing gas. She reports some dizziness upon walking, but states this is similar to other times she has been under anesthesia. Ms. Rico reports some vaginal spotting, and no severe abdominal pain.       Objective     Min/max vitals past 24 hours:   Temp  Min: 98.2 °F (36.8 °C)  Max: 98.9 °F (37.2 °C)  BP  Min: 93/52  Max: 139/78  Pulse  Min: 59  Max: 112  Pulse  Min: 59  Max: 112        General: well developed; well nourished  no acute distress   Abdomen: Soft and appropriately tender   Pelvic: Not performed   Ext: Calves NT.  Heart regular rate and rhythm with no murmurs, gallops, rubs. Lungs clear to auscultation bilaterally. Minimal leg edema bilaterally.     Lab Results   Component Value Date    WBC 9.66 10/15/2019    HGB 9.6 (L) 10/15/2019    HCT 28.4 (L) 10/15/2019    MCV 94.0 10/15/2019     10/15/2019    RH Positive 10/14/2019        Assessment   1. POD#1 S/P total laproscopic hysterectomy. Surgery was complicated by uterine adhesions to the bladder, so it is likely this irritation was causing her difficulty in voiding urine. Cystoscopy was performed twice during the procedure to confirm that there was no perforation of the bladder. Her vergara catheter was just being removed as of this morning. We will continue to monitor for appropriate voiding of urine. Her vital signs have remained normal, and CBC this morning shows Hb of 9.6 and Hct of 28.4.    "   Plan   1. Continue routine post-operative care  2. Ambulate   3. Continue infection prophylaxis and pain control measures  4. Remove vergara catheter with possible discharge this afternoon if urine is appropriately voided  5. UA  6. Pyridium for bladder pain  7. Reevaluate this afternoon    Mayito Sarah, MS3    I have seen and evaluated the patient.  I have discussed the case with the medical student. I have reviewed the notes, assessment and plan, and/or procedures performed by the medical student.  I concur with the medical student’s documentation.        This document has been electronically signed by Rony Mariee DO on October 15, 2019 8:26 AM

## 2019-10-16 LAB — BACTERIA SPEC AEROBE CULT: NO GROWTH

## 2019-10-19 ENCOUNTER — APPOINTMENT (OUTPATIENT)
Dept: GENERAL RADIOLOGY | Facility: HOSPITAL | Age: 29
End: 2019-10-19

## 2019-10-19 ENCOUNTER — HOSPITAL ENCOUNTER (EMERGENCY)
Facility: HOSPITAL | Age: 29
Discharge: HOME | End: 2019-10-20
Attending: EMERGENCY MEDICINE | Admitting: EMERGENCY MEDICINE

## 2019-10-19 DIAGNOSIS — K59.03 DRUG-INDUCED CONSTIPATION: Primary | ICD-10-CM

## 2019-10-19 LAB
BACTERIA UR QL AUTO: ABNORMAL /HPF
BASOPHILS # BLD AUTO: 0.04 10*3/MM3 (ref 0–0.2)
BASOPHILS NFR BLD AUTO: 0.3 % (ref 0–1.5)
BILIRUB UR QL STRIP: NEGATIVE
CLARITY UR: ABNORMAL
COLOR UR: YELLOW
DEPRECATED RDW RBC AUTO: 39.4 FL (ref 37–54)
EOSINOPHIL # BLD AUTO: 0.25 10*3/MM3 (ref 0–0.4)
EOSINOPHIL NFR BLD AUTO: 2.2 % (ref 0.3–6.2)
ERYTHROCYTE [DISTWIDTH] IN BLOOD BY AUTOMATED COUNT: 11.9 % (ref 12.3–15.4)
GLUCOSE UR STRIP-MCNC: NEGATIVE MG/DL
HCT VFR BLD AUTO: 31 % (ref 34–46.6)
HGB BLD-MCNC: 10.7 G/DL (ref 12–15.9)
HGB UR QL STRIP.AUTO: ABNORMAL
HYALINE CASTS UR QL AUTO: ABNORMAL /LPF
IMM GRANULOCYTES # BLD AUTO: 0.04 10*3/MM3 (ref 0–0.05)
IMM GRANULOCYTES NFR BLD AUTO: 0.3 % (ref 0–0.5)
KETONES UR QL STRIP: ABNORMAL
LEUKOCYTE ESTERASE UR QL STRIP.AUTO: NEGATIVE
LYMPHOCYTES # BLD AUTO: 1.53 10*3/MM3 (ref 0.7–3.1)
LYMPHOCYTES NFR BLD AUTO: 13.4 % (ref 19.6–45.3)
MCH RBC QN AUTO: 31.3 PG (ref 26.6–33)
MCHC RBC AUTO-ENTMCNC: 34.5 G/DL (ref 31.5–35.7)
MCV RBC AUTO: 90.6 FL (ref 79–97)
MONOCYTES # BLD AUTO: 0.87 10*3/MM3 (ref 0.1–0.9)
MONOCYTES NFR BLD AUTO: 7.6 % (ref 5–12)
NEUTROPHILS # BLD AUTO: 8.7 10*3/MM3 (ref 1.7–7)
NEUTROPHILS NFR BLD AUTO: 76.2 % (ref 42.7–76)
NITRITE UR QL STRIP: NEGATIVE
NRBC BLD AUTO-RTO: 0 /100 WBC (ref 0–0.2)
PH UR STRIP.AUTO: 6 [PH] (ref 5–9)
PLATELET # BLD AUTO: 264 10*3/MM3 (ref 140–450)
PMV BLD AUTO: 9.9 FL (ref 6–12)
PROT UR QL STRIP: NEGATIVE
RBC # BLD AUTO: 3.42 10*6/MM3 (ref 3.77–5.28)
RBC # UR: ABNORMAL /HPF
REF LAB TEST METHOD: ABNORMAL
SP GR UR STRIP: 1.01 (ref 1–1.03)
SQUAMOUS #/AREA URNS HPF: ABNORMAL /HPF
UROBILINOGEN UR QL STRIP: ABNORMAL
WBC NRBC COR # BLD: 11.43 10*3/MM3 (ref 3.4–10.8)
WBC UR QL AUTO: ABNORMAL /HPF

## 2019-10-19 PROCEDURE — 99284 EMERGENCY DEPT VISIT MOD MDM: CPT

## 2019-10-19 PROCEDURE — 85025 COMPLETE CBC W/AUTO DIFF WBC: CPT | Performed by: EMERGENCY MEDICINE

## 2019-10-19 PROCEDURE — 80053 COMPREHEN METABOLIC PANEL: CPT | Performed by: EMERGENCY MEDICINE

## 2019-10-19 PROCEDURE — 74019 RADEX ABDOMEN 2 VIEWS: CPT

## 2019-10-19 PROCEDURE — 81001 URINALYSIS AUTO W/SCOPE: CPT | Performed by: EMERGENCY MEDICINE

## 2019-10-19 RX ORDER — SODIUM CHLORIDE 0.9 % (FLUSH) 0.9 %
10 SYRINGE (ML) INJECTION AS NEEDED
Status: DISCONTINUED | OUTPATIENT
Start: 2019-10-19 | End: 2019-10-20 | Stop reason: HOSPADM

## 2019-10-20 VITALS
DIASTOLIC BLOOD PRESSURE: 70 MMHG | BODY MASS INDEX: 22.39 KG/M2 | HEART RATE: 98 BPM | WEIGHT: 126.4 LBS | OXYGEN SATURATION: 99 % | SYSTOLIC BLOOD PRESSURE: 106 MMHG | TEMPERATURE: 98.6 F | HEIGHT: 63 IN | RESPIRATION RATE: 20 BRPM

## 2019-10-20 LAB
ALBUMIN SERPL-MCNC: 4.2 G/DL (ref 3.5–5.2)
ALBUMIN/GLOB SERPL: 1.4 G/DL
ALP SERPL-CCNC: 130 U/L (ref 39–117)
ALT SERPL W P-5'-P-CCNC: 87 U/L (ref 1–33)
ANION GAP SERPL CALCULATED.3IONS-SCNC: 10 MMOL/L (ref 5–15)
AST SERPL-CCNC: 51 U/L (ref 1–32)
BILIRUB SERPL-MCNC: 0.6 MG/DL (ref 0.2–1.2)
BUN BLD-MCNC: 7 MG/DL (ref 6–20)
BUN/CREAT SERPL: 12.5 (ref 7–25)
CALCIUM SPEC-SCNC: 9 MG/DL (ref 8.6–10.5)
CHLORIDE SERPL-SCNC: 98 MMOL/L (ref 98–107)
CO2 SERPL-SCNC: 27 MMOL/L (ref 22–29)
CREAT BLD-MCNC: 0.56 MG/DL (ref 0.57–1)
GFR SERPL CREATININE-BSD FRML MDRD: 126 ML/MIN/1.73
GLOBULIN UR ELPH-MCNC: 3 GM/DL
GLUCOSE BLD-MCNC: 111 MG/DL (ref 65–99)
POTASSIUM BLD-SCNC: 3.3 MMOL/L (ref 3.5–5.2)
PROT SERPL-MCNC: 7.2 G/DL (ref 6–8.5)
SODIUM BLD-SCNC: 135 MMOL/L (ref 136–145)

## 2019-10-20 NOTE — DISCHARGE INSTRUCTIONS
Drink plenty of fluids to maintain hydration with the bulk forming laxative.  Followup with OB/GYN Monday should symptoms persist for further evaluation.

## 2019-10-20 NOTE — ED PROVIDER NOTES
Subjective   Patient presents emergency department with complaint of constipation postop.  Patient had a hysterectomy earlier in the week with Dr. Mariee.  Patient notes that she had passed some gas before her discharge on Monday.  Patient states she has had no bowel movement since that time.  Patient notes that she has not been eating during this time.  As well she is only been drinking fluids to help her bowels move.  Patient has had some docusate as well as MiraLAX.  States that this caused some intense cramping but no bowel movements.  Patient denies any vomiting with this.            Review of Systems   Constitutional: Negative.  Negative for appetite change, chills and fever.   HENT: Negative.  Negative for congestion.    Eyes: Negative.  Negative for photophobia and visual disturbance.   Respiratory: Negative.  Negative for cough, chest tightness and shortness of breath.    Cardiovascular: Negative.  Negative for chest pain and palpitations.   Gastrointestinal: Positive for abdominal pain and constipation. Negative for diarrhea, nausea and vomiting.   Endocrine: Negative.    Genitourinary: Negative.  Negative for decreased urine volume, dysuria, flank pain and hematuria.   Musculoskeletal: Negative.  Negative for arthralgias, back pain, myalgias, neck pain and neck stiffness.   Skin: Negative.  Negative for pallor.   Neurological: Negative.  Negative for dizziness, syncope, weakness, light-headedness, numbness and headaches.   Psychiatric/Behavioral: Negative.  Negative for confusion and suicidal ideas. The patient is not nervous/anxious.    All other systems reviewed and are negative.      Past Medical History:   Diagnosis Date   • Abdominal pain    • Alopecia    • Anxiety    • Contact dermatitis    • Disorder of gallbladder     gallstones    • Dysuria    • Gastroesophageal reflux disease    • Itch of skin    • Kidney stone    • Menometrorrhagia 9/14/2017   • Menometrorrhagia    • PONV (postoperative nausea  and vomiting)    • Pruritic rash     vesicular rash      • Recurrent major depressive disorder, in partial remission (CMS/HCC) 2017   • Right upper quadrant pain    • Screening for respiratory condition    • Tubal pregnancy without intrauterine pregnancy 6/15/2017   • Tuberculosis screening    • Urinary tract infectious disease        Allergies   Allergen Reactions   • Bactrim [Sulfamethoxazole-Trimethoprim] Hives   • Morphine And Related Shortness Of Breath   • Wellbutrin [Bupropion] Rash   • Penicillins Other (See Comments)     childhood       Past Surgical History:   Procedure Laterality Date   •  SECTION     • CHOLECYSTECTOMY  2014    and intraoperative cholangiogram., interpreted intraoperatively under fluoroscopy   • INJECTION OF MEDICATION  2014    Depo Medrol(1)   • LAPAROSCOPIC ASSISTED VAGINAL HYSTERECTOMY N/A 10/14/2019    Procedure: TOTAL LAPAROSCOPIC HYSTERECTOMY, CYSTOSCOPY;  Surgeon: Rony Mariee DO;  Location: [facility];  Service: Obstetrics/Gynecology   • LAPAROSCOPIC EXPLORATION FOR ECTOPIC PREGNANCY Bilateral 2017    Procedure: LAPAROSCOPIC EXPLORATION FOR ECTOPIC PREGNANCY;  Surgeon: Duy Jaramillo MD;  Location: [facility] OR;  Service:    • SALPINGECTOMY Bilateral 2017    Procedure: SALPINGECTOMY LAPAROSCOPIC;  Surgeon: Duy Jaramillo MD;  Location: [facility];  Service:    • TONSILLECTOMY     • TUBAL ABDOMINAL LIGATION         Family History   Problem Relation Age of Onset   • Hypertension Father    • Cancer Maternal Grandmother    • Heart disease Maternal Grandmother    • Thyroid disease Maternal Grandmother    • Cancer Maternal Grandfather    • Heart disease Maternal Grandfather    • Cancer Paternal Grandfather        Social History     Socioeconomic History   • Marital status:      Spouse name: Not on file   • Number of children: Not on file   • Years of education: Not on file   • Highest education level: Not on file   Tobacco Use   • Smoking  status: Former Smoker   • Smokeless tobacco: Never Used   Substance and Sexual Activity   • Alcohol use: Yes     Comment: occasionally   • Drug use: No   • Sexual activity: Defer           Objective   Physical Exam   Constitutional: She is oriented to person, place, and time. She appears well-developed and well-nourished. No distress.   HENT:   Head: Normocephalic and atraumatic.   Nose: Nose normal.   Mouth/Throat: Oropharynx is clear and moist.   Mucous membranes, normal skin turgor.  No evidence of dehydration.   Eyes: Conjunctivae and EOM are normal. No scleral icterus.   Neck: Normal range of motion. Neck supple. No JVD present.   Cardiovascular: Normal rate, regular rhythm, normal heart sounds and intact distal pulses. Exam reveals no gallop and no friction rub.   No murmur heard.  Pulmonary/Chest: Effort normal. No respiratory distress. She has no wheezes. She has no rales. She exhibits no tenderness.   Abdominal: Soft. She exhibits no distension and no mass. There is tenderness. There is no rebound and no guarding.   No guarding rigidity or rebound.    Mild tenderness at the insertion sites.   Musculoskeletal: Normal range of motion. She exhibits no edema, tenderness or deformity.   Lymphadenopathy:     She has no cervical adenopathy.   Neurological: She is alert and oriented to person, place, and time. No cranial nerve deficit. She exhibits normal muscle tone.   Skin: Skin is warm and dry. Capillary refill takes less than 2 seconds. No rash noted. She is not diaphoretic. No erythema. No pallor.   Psychiatric: She has a normal mood and affect. Her behavior is normal. Judgment and thought content normal.   Nursing note and vitals reviewed.      Procedures           ED Course      Labs Reviewed   COMPREHENSIVE METABOLIC PANEL - Abnormal; Notable for the following components:       Result Value    Glucose 111 (*)     Creatinine 0.56 (*)     Sodium 135 (*)     Potassium 3.3 (*)     ALT (SGPT) 87 (*)     AST  (SGOT) 51 (*)     Alkaline Phosphatase 130 (*)     All other components within normal limits    Narrative:     GFR Normal >60  Chronic Kidney Disease <60  Kidney Failure <15   URINALYSIS W/ MICROSCOPIC IF INDICATED (NO CULTURE) - Abnormal; Notable for the following components:    Appearance, UA Cloudy (*)     Ketones, UA 15 mg/dL (1+) (*)     Blood, UA Small (1+) (*)     All other components within normal limits   CBC WITH AUTO DIFFERENTIAL - Abnormal; Notable for the following components:    WBC 11.43 (*)     RBC 3.42 (*)     Hemoglobin 10.7 (*)     Hematocrit 31.0 (*)     RDW 11.9 (*)     Neutrophil % 76.2 (*)     Lymphocyte % 13.4 (*)     Neutrophils, Absolute 8.70 (*)     All other components within normal limits   URINALYSIS, MICROSCOPIC ONLY - Abnormal; Notable for the following components:    RBC, UA 6-12 (*)     Bacteria, UA 1+ (*)     Squamous Epithelial Cells, UA 6-12 (*)     All other components within normal limits   CBC AND DIFFERENTIAL    Narrative:     The following orders were created for panel order CBC & Differential.  Procedure                               Abnormality         Status                     ---------                               -----------         ------                     CBC Auto Differential[[id number]]        Abnormal            Final result                 Please view results for these tests on the individual orders.       XR Abdomen Flat & Upright   Final Result   Nonspecific bowel gas pattern.      Electronically signed by:  Ken Heredia MD  10/19/2019 11:32 PM   CDT Workstation: [id number]        Patient with constipation, there is no fecal impaction noted.  Most likely drug-induced constipation for the patient's narcotics postop.  Will start Linzess as well as Metamucil.  Follow-up will be on Monday should symptoms persist.            MDM    Final diagnoses:   Drug-induced constipation              Blake Keen MD  10/20/19 0030

## 2019-10-24 ENCOUNTER — TELEPHONE (OUTPATIENT)
Dept: OBSTETRICS AND GYNECOLOGY | Facility: CLINIC | Age: 29
End: 2019-10-24

## 2019-10-24 DIAGNOSIS — R30.0 BURNING WITH URINATION: Primary | ICD-10-CM

## 2019-10-25 ENCOUNTER — APPOINTMENT (OUTPATIENT)
Dept: LAB | Facility: HOSPITAL | Age: 29
End: 2019-10-25

## 2019-10-25 ENCOUNTER — OFFICE VISIT (OUTPATIENT)
Dept: OBSTETRICS AND GYNECOLOGY | Facility: CLINIC | Age: 29
End: 2019-10-25

## 2019-10-25 VITALS
HEIGHT: 63 IN | BODY MASS INDEX: 22.18 KG/M2 | WEIGHT: 125.2 LBS | SYSTOLIC BLOOD PRESSURE: 106 MMHG | DIASTOLIC BLOOD PRESSURE: 60 MMHG

## 2019-10-25 DIAGNOSIS — Z09 POSTOPERATIVE FOLLOW-UP: ICD-10-CM

## 2019-10-25 DIAGNOSIS — G89.18 POST-OP PAIN: ICD-10-CM

## 2019-10-25 DIAGNOSIS — R30.0 BURNING WITH URINATION: Primary | ICD-10-CM

## 2019-10-25 PROBLEM — N92.1 MENOMETRORRHAGIA: Status: RESOLVED | Noted: 2019-10-14 | Resolved: 2019-10-25

## 2019-10-25 LAB
ALBUMIN SERPL-MCNC: 3.6 G/DL (ref 3.5–5.2)
ALBUMIN/GLOB SERPL: 1 G/DL
ALP SERPL-CCNC: 87 U/L (ref 39–117)
ALT SERPL W P-5'-P-CCNC: 20 U/L (ref 1–33)
ANION GAP SERPL CALCULATED.3IONS-SCNC: 11.5 MMOL/L (ref 5–15)
AST SERPL-CCNC: 10 U/L (ref 1–32)
BACTERIA UR QL AUTO: ABNORMAL /HPF
BASOPHILS # BLD AUTO: 0.07 10*3/MM3 (ref 0–0.2)
BASOPHILS NFR BLD AUTO: 0.6 % (ref 0–1.5)
BILIRUB SERPL-MCNC: 0.2 MG/DL (ref 0.2–1.2)
BILIRUB UR QL STRIP: NEGATIVE
BUN BLD-MCNC: 7 MG/DL (ref 6–20)
BUN/CREAT SERPL: 11.5 (ref 7–25)
CALCIUM SPEC-SCNC: 9 MG/DL (ref 8.6–10.5)
CHLORIDE SERPL-SCNC: 101 MMOL/L (ref 98–107)
CLARITY UR: ABNORMAL
CO2 SERPL-SCNC: 27.5 MMOL/L (ref 22–29)
COLOR UR: ABNORMAL
CREAT BLD-MCNC: 0.61 MG/DL (ref 0.57–1)
DEPRECATED RDW RBC AUTO: 41 FL (ref 37–54)
EOSINOPHIL # BLD AUTO: 0.62 10*3/MM3 (ref 0–0.4)
EOSINOPHIL NFR BLD AUTO: 5.5 % (ref 0.3–6.2)
ERYTHROCYTE [DISTWIDTH] IN BLOOD BY AUTOMATED COUNT: 11.9 % (ref 12.3–15.4)
GFR SERPL CREATININE-BSD FRML MDRD: 114 ML/MIN/1.73
GLOBULIN UR ELPH-MCNC: 3.5 GM/DL
GLUCOSE BLD-MCNC: 104 MG/DL (ref 65–99)
GLUCOSE UR STRIP-MCNC: NEGATIVE MG/DL
HCT VFR BLD AUTO: 31.4 % (ref 34–46.6)
HGB BLD-MCNC: 10.5 G/DL (ref 12–15.9)
HGB UR QL STRIP.AUTO: ABNORMAL
HYALINE CASTS UR QL AUTO: ABNORMAL /LPF
IMM GRANULOCYTES # BLD AUTO: 0.07 10*3/MM3 (ref 0–0.05)
IMM GRANULOCYTES NFR BLD AUTO: 0.6 % (ref 0–0.5)
KETONES UR QL STRIP: NEGATIVE
LEUKOCYTE ESTERASE UR QL STRIP.AUTO: ABNORMAL
LYMPHOCYTES # BLD AUTO: 1.6 10*3/MM3 (ref 0.7–3.1)
LYMPHOCYTES NFR BLD AUTO: 14.1 % (ref 19.6–45.3)
MCH RBC QN AUTO: 31.8 PG (ref 26.6–33)
MCHC RBC AUTO-ENTMCNC: 33.4 G/DL (ref 31.5–35.7)
MCV RBC AUTO: 95.2 FL (ref 79–97)
MONOCYTES # BLD AUTO: 0.49 10*3/MM3 (ref 0.1–0.9)
MONOCYTES NFR BLD AUTO: 4.3 % (ref 5–12)
NEUTROPHILS # BLD AUTO: 8.47 10*3/MM3 (ref 1.7–7)
NEUTROPHILS NFR BLD AUTO: 74.9 % (ref 42.7–76)
NITRITE UR QL STRIP: POSITIVE
NRBC BLD AUTO-RTO: 0 /100 WBC (ref 0–0.2)
PH UR STRIP.AUTO: 6 [PH] (ref 5–9)
PLATELET # BLD AUTO: 444 10*3/MM3 (ref 140–450)
PMV BLD AUTO: 10.2 FL (ref 6–12)
POTASSIUM BLD-SCNC: 4.3 MMOL/L (ref 3.5–5.2)
PROT SERPL-MCNC: 7.1 G/DL (ref 6–8.5)
PROT UR QL STRIP: NEGATIVE
RBC # BLD AUTO: 3.3 10*6/MM3 (ref 3.77–5.28)
RBC # UR: ABNORMAL /HPF
REF LAB TEST METHOD: ABNORMAL
SODIUM BLD-SCNC: 140 MMOL/L (ref 136–145)
SP GR UR STRIP: 1.01 (ref 1–1.03)
SQUAMOUS #/AREA URNS HPF: ABNORMAL /HPF
UROBILINOGEN UR QL STRIP: ABNORMAL
WBC NRBC COR # BLD: 11.32 10*3/MM3 (ref 3.4–10.8)
WBC UR QL AUTO: ABNORMAL /HPF

## 2019-10-25 PROCEDURE — 81001 URINALYSIS AUTO W/SCOPE: CPT | Performed by: OBSTETRICS & GYNECOLOGY

## 2019-10-25 PROCEDURE — 85025 COMPLETE CBC W/AUTO DIFF WBC: CPT | Performed by: OBSTETRICS & GYNECOLOGY

## 2019-10-25 PROCEDURE — 80053 COMPREHEN METABOLIC PANEL: CPT | Performed by: OBSTETRICS & GYNECOLOGY

## 2019-10-25 PROCEDURE — 36415 COLL VENOUS BLD VENIPUNCTURE: CPT | Performed by: OBSTETRICS & GYNECOLOGY

## 2019-10-25 PROCEDURE — 99024 POSTOP FOLLOW-UP VISIT: CPT | Performed by: OBSTETRICS & GYNECOLOGY

## 2019-10-25 NOTE — PROGRESS NOTES
Chief complaint: Postop follow-up    Patient presents for postop follow-up visit.  States that she still is having some pain and burning with urination and feels like she is having trouble completely emptying.  Also was having some problems with constipation, was seen in the ED over the weekend and was treated for severe constipation.  Since that time has had a bowel movement and is feeling better but is having some sharp shooting pains in her abdomen.  Otherwise she is ambulating without difficulty and tolerating a regular diet.  Denies any fevers or chills at this time.    Abdomen soft, appropriately tender, no rebound or guarding.  Trocar site incisions are healing appropriately at this time.    Patient overall doing well and healing appropriately at this time.  Plan to get urine, CBC and CMP to evaluate for any bleeding, infection kidney issues or urinary tract infection.  Will call patient with results when available if patient has urinary tract infection.  Patient to follow-up to see me in 1 week.  Return sooner as needed.

## 2019-10-28 RX ORDER — NITROFURANTOIN 25; 75 MG/1; MG/1
100 CAPSULE ORAL 2 TIMES DAILY
Qty: 14 CAPSULE | Refills: 0 | Status: SHIPPED | OUTPATIENT
Start: 2019-10-28 | End: 2019-11-08

## 2019-11-08 ENCOUNTER — OFFICE VISIT (OUTPATIENT)
Dept: OBSTETRICS AND GYNECOLOGY | Facility: CLINIC | Age: 29
End: 2019-11-08

## 2019-11-08 VITALS
HEIGHT: 63 IN | BODY MASS INDEX: 22.32 KG/M2 | DIASTOLIC BLOOD PRESSURE: 60 MMHG | SYSTOLIC BLOOD PRESSURE: 102 MMHG | WEIGHT: 126 LBS

## 2019-11-08 DIAGNOSIS — Z09 POSTOPERATIVE FOLLOW-UP: Primary | ICD-10-CM

## 2019-11-08 PROCEDURE — 99024 POSTOP FOLLOW-UP VISIT: CPT | Performed by: OBSTETRICS & GYNECOLOGY

## 2019-11-08 NOTE — PROGRESS NOTES
Chief complaint: Postop follow-up    Patient presents for postop follow-up approximately 3 to 4 weeks since laparoscopic hysterectomy.  States that bladder pain has improved since taking Macrobid and she is overall feeling very well.  Bowel movements have also improved.  Patient states that pain is overall much better.  She is ambulating without difficulty, urinating without difficulty.  And tolerating regular diet.  No fevers at this time.    Abdomen soft, nontender, appropriately healing trocar site incisions.  No rebound or guarding.  No distress.  Vital signs reviewed    Doing well and healing appropriately at this time.  Plan to see patient back in approximately 2 to 3 weeks.  Will do vaginal exam at that time likely should be able to return back to work on December 1 and hopefully return to full activity at that time.  Return sooner as needed.

## 2019-11-26 ENCOUNTER — OFFICE VISIT (OUTPATIENT)
Dept: OBSTETRICS AND GYNECOLOGY | Facility: CLINIC | Age: 29
End: 2019-11-26

## 2019-11-26 VITALS
HEART RATE: 77 BPM | SYSTOLIC BLOOD PRESSURE: 108 MMHG | WEIGHT: 123.6 LBS | RESPIRATION RATE: 16 BRPM | BODY MASS INDEX: 21.9 KG/M2 | DIASTOLIC BLOOD PRESSURE: 70 MMHG | OXYGEN SATURATION: 97 % | HEIGHT: 63 IN

## 2019-11-26 DIAGNOSIS — Z09 POSTOPERATIVE FOLLOW-UP: Primary | ICD-10-CM

## 2019-11-26 PROCEDURE — 99024 POSTOP FOLLOW-UP VISIT: CPT | Performed by: OBSTETRICS & GYNECOLOGY

## 2019-11-26 NOTE — PROGRESS NOTES
Status post hysterectomy approximately 6 weeks ago.  Doing well and recovering appropriately at this time.  Urinating without difficulty tolerating a regular diet.  Ambulating without difficulty.  No pain at this time.    Vital signs reviewed  No acute distress  Awake and oriented x3  Abdomen nontender with healed trocar sites  Vaginal cuff visually intact, and palpated intact mildly tender to palpation    Overall doing well and recovered at this time.  Patient may return to work next week, no further lifting or exercise restrictions.  Recommended no intercourse for 2 more weeks, patient may return to full normal activities at that time.  Patient to return as needed.

## 2019-12-23 ENCOUNTER — OFFICE VISIT (OUTPATIENT)
Dept: FAMILY MEDICINE CLINIC | Facility: CLINIC | Age: 29
End: 2019-12-23

## 2019-12-23 ENCOUNTER — LAB (OUTPATIENT)
Dept: LAB | Facility: OTHER | Age: 29
End: 2019-12-23

## 2019-12-23 VITALS
OXYGEN SATURATION: 98 % | BODY MASS INDEX: 21.97 KG/M2 | WEIGHT: 124 LBS | HEART RATE: 84 BPM | HEIGHT: 63 IN | SYSTOLIC BLOOD PRESSURE: 122 MMHG | TEMPERATURE: 100.6 F | DIASTOLIC BLOOD PRESSURE: 98 MMHG

## 2019-12-23 DIAGNOSIS — R11.2 NAUSEA AND VOMITING, INTRACTABILITY OF VOMITING NOT SPECIFIED, UNSPECIFIED VOMITING TYPE: ICD-10-CM

## 2019-12-23 DIAGNOSIS — R50.9 FEVER, UNSPECIFIED FEVER CAUSE: ICD-10-CM

## 2019-12-23 DIAGNOSIS — R50.9 FEVER, UNSPECIFIED FEVER CAUSE: Primary | ICD-10-CM

## 2019-12-23 DIAGNOSIS — R30.0 DYSURIA: Primary | ICD-10-CM

## 2019-12-23 DIAGNOSIS — R30.0 DYSURIA: ICD-10-CM

## 2019-12-23 LAB
ANION GAP SERPL CALCULATED.3IONS-SCNC: 8 MMOL/L (ref 5–15)
BACTERIA UR QL AUTO: ABNORMAL /HPF
BASOPHILS # BLD AUTO: 0.01 10*3/MM3 (ref 0–0.2)
BASOPHILS NFR BLD AUTO: 0.1 % (ref 0–1.5)
BILIRUB UR QL STRIP: NEGATIVE
BUN BLD-MCNC: 19 MG/DL (ref 7–23)
BUN/CREAT SERPL: 33.9 (ref 7–25)
CALCIUM SPEC-SCNC: 9 MG/DL (ref 8.4–10.2)
CHLORIDE SERPL-SCNC: 103 MMOL/L (ref 101–112)
CLARITY UR: CLEAR
CO2 SERPL-SCNC: 26 MMOL/L (ref 22–30)
COLOR UR: YELLOW
CREAT BLD-MCNC: 0.56 MG/DL (ref 0.52–1.04)
DEPRECATED RDW RBC AUTO: 39.8 FL (ref 37–54)
EOSINOPHIL # BLD AUTO: 0.03 10*3/MM3 (ref 0–0.4)
EOSINOPHIL NFR BLD AUTO: 0.3 % (ref 0.3–6.2)
ERYTHROCYTE [DISTWIDTH] IN BLOOD BY AUTOMATED COUNT: 12.3 % (ref 12.3–15.4)
FLUAV AG NPH QL: NEGATIVE
FLUBV AG NPH QL IA: NEGATIVE
GFR SERPL CREATININE-BSD FRML MDRD: 126 ML/MIN/1.73 (ref 64–149)
GLUCOSE BLD-MCNC: 117 MG/DL (ref 70–99)
GLUCOSE UR STRIP-MCNC: NEGATIVE MG/DL
HCT VFR BLD AUTO: 37.3 % (ref 34–46.6)
HGB BLD-MCNC: 12.6 G/DL (ref 12–15.9)
HGB UR QL STRIP.AUTO: NEGATIVE
HYALINE CASTS UR QL AUTO: ABNORMAL /LPF
KETONES UR QL STRIP: ABNORMAL
LEUKOCYTE ESTERASE UR QL STRIP.AUTO: NEGATIVE
LYMPHOCYTES # BLD AUTO: 0.4 10*3/MM3 (ref 0.7–3.1)
LYMPHOCYTES NFR BLD AUTO: 4.1 % (ref 19.6–45.3)
MCH RBC QN AUTO: 30.5 PG (ref 26.6–33)
MCHC RBC AUTO-ENTMCNC: 33.8 G/DL (ref 31.5–35.7)
MCV RBC AUTO: 90.3 FL (ref 79–97)
MONOCYTES # BLD AUTO: 0.35 10*3/MM3 (ref 0.1–0.9)
MONOCYTES NFR BLD AUTO: 3.6 % (ref 5–12)
NEUTROPHILS # BLD AUTO: 9.05 10*3/MM3 (ref 1.7–7)
NEUTROPHILS NFR BLD AUTO: 91.9 % (ref 42.7–76)
NITRITE UR QL STRIP: NEGATIVE
PH UR STRIP.AUTO: >=9 [PH] (ref 5.5–8)
PLATELET # BLD AUTO: 278 10*3/MM3 (ref 140–450)
PMV BLD AUTO: 9.3 FL (ref 6–12)
POTASSIUM BLD-SCNC: 3.8 MMOL/L (ref 3.4–5)
PROT UR QL STRIP: ABNORMAL
RBC # BLD AUTO: 4.13 10*6/MM3 (ref 3.77–5.28)
RBC # UR: ABNORMAL /HPF
REF LAB TEST METHOD: ABNORMAL
SODIUM BLD-SCNC: 137 MMOL/L (ref 137–145)
SP GR UR STRIP: 1.01 (ref 1–1.03)
SQUAMOUS #/AREA URNS HPF: ABNORMAL /HPF
UROBILINOGEN UR QL STRIP: ABNORMAL
WBC NRBC COR # BLD: 9.84 10*3/MM3 (ref 3.4–10.8)
WBC UR QL AUTO: ABNORMAL /HPF

## 2019-12-23 PROCEDURE — 87086 URINE CULTURE/COLONY COUNT: CPT | Performed by: FAMILY MEDICINE

## 2019-12-23 PROCEDURE — 36415 COLL VENOUS BLD VENIPUNCTURE: CPT | Performed by: FAMILY MEDICINE

## 2019-12-23 PROCEDURE — 80048 BASIC METABOLIC PNL TOTAL CA: CPT | Performed by: FAMILY MEDICINE

## 2019-12-23 PROCEDURE — 81001 URINALYSIS AUTO W/SCOPE: CPT | Performed by: FAMILY MEDICINE

## 2019-12-23 PROCEDURE — 85025 COMPLETE CBC W/AUTO DIFF WBC: CPT | Performed by: FAMILY MEDICINE

## 2019-12-23 PROCEDURE — 87804 INFLUENZA ASSAY W/OPTIC: CPT | Performed by: FAMILY MEDICINE

## 2019-12-23 PROCEDURE — 96372 THER/PROPH/DIAG INJ SC/IM: CPT | Performed by: FAMILY MEDICINE

## 2019-12-23 PROCEDURE — 99213 OFFICE O/P EST LOW 20 MIN: CPT | Performed by: FAMILY MEDICINE

## 2019-12-23 RX ORDER — LEVOFLOXACIN 500 MG/1
500 TABLET, FILM COATED ORAL DAILY
Qty: 10 TABLET | Refills: 0 | Status: SHIPPED | OUTPATIENT
Start: 2019-12-23 | End: 2020-01-28

## 2019-12-23 RX ORDER — PROMETHAZINE HYDROCHLORIDE 25 MG/ML
12.5 INJECTION, SOLUTION INTRAMUSCULAR; INTRAVENOUS ONCE
Status: COMPLETED | OUTPATIENT
Start: 2019-12-23 | End: 2019-12-23

## 2019-12-23 RX ORDER — PROMETHAZINE HYDROCHLORIDE 25 MG/ML
12.5 INJECTION, SOLUTION INTRAMUSCULAR; INTRAVENOUS EVERY 6 HOURS PRN
Status: DISCONTINUED | OUTPATIENT
Start: 2019-12-23 | End: 2019-12-23

## 2019-12-23 RX ADMIN — PROMETHAZINE HYDROCHLORIDE 12.5 MG: 25 INJECTION, SOLUTION INTRAMUSCULAR; INTRAVENOUS at 13:25

## 2019-12-23 NOTE — PROGRESS NOTES
"Subjective   Shelley Rico is a 29 y.o. female who presents to the office with the abrupt onset of dizziness fever cold chills nausea and vomiting around 2 AM.  The vomiting has continued and she is had a bit of diarrhea.  She is having diffuse body aches but no distinct abdominal pain or focal pain except in her mid to low back.  She does have a history of kidney stones but the pain is on both sides.  She has had no dysuria.  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  She did get a flu shot in November.    History of Present Illness     The following portions of the patient's history were reviewed and updated as appropriate: allergies, current medications, past family history, past medical history, past social history, past surgical history and problem list.    Review of Systems   Constitutional: Positive for activity change, appetite change, chills, diaphoresis and fever.   HENT: Negative.    Respiratory: Negative.  Negative for shortness of breath.    Cardiovascular: Negative.  Negative for chest pain.   Gastrointestinal: Positive for abdominal pain, diarrhea, nausea and vomiting.   Musculoskeletal: Positive for back pain. Negative for myalgias.   Skin: Negative.    Allergic/Immunologic: Negative for immunocompromised state.   Neurological: Positive for dizziness, weakness and light-headedness. Negative for tremors, seizures, syncope and numbness.   Hematological: Negative.    Psychiatric/Behavioral: Negative for agitation, confusion, dysphoric mood and sleep disturbance. The patient is not nervous/anxious.    All other systems reviewed and are negative.      Objective    Vitals:    12/23/19 1216   BP: 122/98   Pulse: 84   Temp: (!) 100.6 °F (38.1 °C)   TempSrc: Tympanic   SpO2: 98%   Weight: 56.2 kg (124 lb)   Height: 160 cm (63\")   PainSc:   4   PainLoc: Back     Body mass index is 21.97 kg/m².    Physical Exam   Constitutional: She is oriented to person, place, and time. She appears " well-developed and well-nourished.   Appears ill   HENT:   Head: Normocephalic and atraumatic.   Nose: Nose normal.   Mouth/Throat: Oropharynx is clear and moist.   Mucous membranes slightly dry.  Posterior pharynx shows minimal erythema.   Eyes: Pupils are equal, round, and reactive to light. Conjunctivae and EOM are normal.   Neck: Normal range of motion. Neck supple. No JVD present. No tracheal deviation present. No thyromegaly present.   Cardiovascular: Normal rate, regular rhythm, normal heart sounds and intact distal pulses.   No murmur heard.  Pulmonary/Chest: Effort normal and breath sounds normal. She has no wheezes.   Abdominal: Soft. Bowel sounds are normal. She exhibits no distension. There is no tenderness. There is no rebound and no guarding.   Mild tenderness in the mid to low back   Musculoskeletal: Normal range of motion. She exhibits no edema.   Lymphadenopathy:     She has no cervical adenopathy.   Neurological: She is alert and oriented to person, place, and time. Coordination normal.   Skin: Skin is warm. No rash noted. There is pallor.   Slightly diaphoretic   Psychiatric: She has a normal mood and affect.   Nursing note and vitals reviewed.    Contains abnormal data Basic Metabolic Panel   Dx:  Fever, unspecified fever cause   Component  Ref Range & Units 12:42   Glucose  70 - 99 mg/dL 117High     BUN  7 - 23 mg/dL 19    Creatinine  0.52 - 1.04 mg/dL 0.56    Sodium  137 - 145 mmol/L 137    Potassium  3.4 - 5.0 mmol/L 3.8    Chloride  101 - 112 mmol/L 103    CO2  22.0 - 30.0 mmol/L 26.0    Calcium  8.4 - 10.2 mg/dL 9.0    eGFR Non African Amer  64 - 149 mL/min/1.73 126    BUN/Creatinine Ratio  7.0 - 25.0 33.9High     Anion Gap  5.0 - 15.0 mmol/L 8.0    Resulting Agency [facility] [place] [facility]           Contains abnormal data Basic Metabolic Panel   Dx:  Fever, unspecified fever cause   Component  Ref Range & Units 12:42   Glucose  70 - 99 mg/dL 117High     BUN  7 - 23 mg/dL 19    Creatinine  0.52 - 1.04  mg/dL 0.56    Sodium  137 - 145 mmol/L 137    Potassium  3.4 - 5.0 mmol/L 3.8    Chloride  101 - 112 mmol/L 103    CO2  22.0 - 30.0 mmol/L 26.0    Calcium  8.4 - 10.2 mg/dL 9.0    eGFR Non African Amer  64 - 149 mL/min/1.73 126    BUN/Creatinine Ratio  7.0 - 25.0 33.9High     Anion Gap  5.0 - 15.0 mmol/L 8.0    Resulting Agency [facility] [place] [facility]           Urinalysis With Culture If Indicated - Urine, Clean Catch   Order: [id number]   Status:  Final result   Visible to patient:  No (Not Released)   Next appt:  None   Dx:  Dysuria   Specimen Information: Urine, Clean Catch        Component  Ref Range & Units 11:21   Color, UA  Yellow, Straw Yellow    Appearance, UA  Clear Clear    pH, UA  5.5 - 8.0 >=9.0High     Specific Gravity, UA  1.005 - 1.030 1.015    Glucose, UA  Negative Negative    Ketones, UA  Negative 40 mg/dL (2+)Abnormal     Bilirubin, UA  Negative Negative    Blood, UA  Negative Negative    Protein, UA  Negative 30 mg/dL (1+)Abnormal     Leuk Esterase, UA  Negative Negative    Nitrite, UA  Negative Negative    Urobilinogen, UA  0.2 - 1.0 E.U./dL 0.2 E.U./dL    Resulting Agency [facility] [place] [facility]         Specimen Collected: 12/23/19 11:21 Last Resulted: 12/23/19 11:             Assessment/Plan   Shelley was seen today for nausea.    Diagnoses and all orders for this visit:    Fever, unspecified fever cause  -     Cancel: CBC & Differential; Future  -     Cancel: Basic Metabolic Panel; Future  -     Cancel: Influenza Antigen, Rapid - Swab, Nasopharynx; Future  -     CBC & Differential; Future  -     Basic Metabolic Panel; Future  -     Influenza Antigen, Rapid - Swab, Nasopharynx; Future  -     Discontinue: promethazine (PHENERGAN) injection 12.5 mg  -     promethazine (PHENERGAN) injection 12.5 mg    Nausea and vomiting, intractability of vomiting not specified, unspecified vomiting type    Other orders  -     levoFLOXacin (LEVAQUIN) 500 MG tablet; Take 1 tablet by mouth Daily.    White blood cell count is normal but  differential indicates a left shift.  Urinalysis is negative for infection but she does have ketones and protein indicating some dehydration.  BUN and creatinine are normal.  This may be an early viral infection or bacterial infection that has not manifested yet.  Flu swab is negative.  Will start Levaquin because of concern about kidney infection possibility and continue to monitor closely.  Advised her to go to ER for IV fluids if she still having nausea and vomiting in the next 24 hours and in the meantime gave Phenergan 25 mg and sent in Phenergan gel.  The patient has read and signed the [facility] Controlled Substance Contract.  I will continue to see patient for regular follow up appointments. Patient is well controlled on the medication.  JENNIFER has been reviewed by me and is updated every 3 months. The patient is aware of the potential for addiction and dependence.         This document has been electronically signed by Dena Billings MD on December 23, 2019 2:27 PM

## 2019-12-24 LAB — BACTERIA SPEC AEROBE CULT: NO GROWTH

## 2020-01-28 ENCOUNTER — OFFICE VISIT (OUTPATIENT)
Dept: FAMILY MEDICINE CLINIC | Facility: CLINIC | Age: 30
End: 2020-01-28

## 2020-01-28 VITALS
WEIGHT: 124.4 LBS | BODY MASS INDEX: 22.04 KG/M2 | HEART RATE: 110 BPM | HEIGHT: 63 IN | DIASTOLIC BLOOD PRESSURE: 66 MMHG | TEMPERATURE: 97.7 F | SYSTOLIC BLOOD PRESSURE: 110 MMHG

## 2020-01-28 DIAGNOSIS — R50.9 FEVER, UNSPECIFIED FEVER CAUSE: ICD-10-CM

## 2020-01-28 DIAGNOSIS — G47.00 INSOMNIA, UNSPECIFIED TYPE: ICD-10-CM

## 2020-01-28 DIAGNOSIS — R68.89 FLU-LIKE SYMPTOMS: Primary | ICD-10-CM

## 2020-01-28 DIAGNOSIS — F41.9 ANXIETY: ICD-10-CM

## 2020-01-28 LAB
FLUAV AG NPH QL: NEGATIVE
FLUBV AG NPH QL IA: NEGATIVE

## 2020-01-28 PROCEDURE — 87804 INFLUENZA ASSAY W/OPTIC: CPT | Performed by: NURSE PRACTITIONER

## 2020-01-28 PROCEDURE — 99213 OFFICE O/P EST LOW 20 MIN: CPT | Performed by: NURSE PRACTITIONER

## 2020-01-28 RX ORDER — OSELTAMIVIR PHOSPHATE 75 MG/1
75 CAPSULE ORAL 2 TIMES DAILY
Qty: 10 CAPSULE | Refills: 0 | Status: SHIPPED | OUTPATIENT
Start: 2020-01-28 | End: 2020-02-02

## 2020-01-28 RX ORDER — HYDROXYZINE HYDROCHLORIDE 25 MG/1
25 TABLET, FILM COATED ORAL 3 TIMES DAILY PRN
Qty: 90 TABLET | Refills: 1 | Status: SHIPPED | OUTPATIENT
Start: 2020-01-28 | End: 2020-09-09

## 2020-01-28 RX ORDER — BROMPHENIRAMINE MALEATE, PSEUDOEPHEDRINE HYDROCHLORIDE, AND DEXTROMETHORPHAN HYDROBROMIDE 2; 30; 10 MG/5ML; MG/5ML; MG/5ML
5 SYRUP ORAL 4 TIMES DAILY PRN
Qty: 118 ML | Refills: 0 | Status: SHIPPED | OUTPATIENT
Start: 2020-01-28 | End: 2020-09-09

## 2020-01-28 NOTE — PROGRESS NOTES
"Subjective   Shelley Rico is a 29 y.o. female. Patient here today with complaints of Cough and Fever  pt here today with complaints of cough, headache, fever with tmax 102 this morning. Has been exposed to RSV and flu. Symptoms started suddenly last night. She reports has taken tylenol /motrin prn fever. Reports adequate I&O. Did have flu shot. She also has anxiety, insomnia, is on atarax which she states is working well for her and she requests refills on this today.    Vitals:    01/28/20 1459   BP: 110/66   Pulse: 110   Temp: 97.7 °F (36.5 °C)   Weight: 56.4 kg (124 lb 6.4 oz)   Height: 160 cm (63\")   PainSc: 0-No pain     Body mass index is 22.04 kg/m².  Past Medical History:   Diagnosis Date   • Abdominal pain    • Alopecia    • Anxiety    • Contact dermatitis    • Disorder of gallbladder     gallstones    • Dysuria    • Gastroesophageal reflux disease    • Itch of skin    • Kidney stone    • Menometrorrhagia 9/14/2017   • Menometrorrhagia    • PONV (postoperative nausea and vomiting)    • Pruritic rash     vesicular rash      • Recurrent major depressive disorder, in partial remission (CMS/[facility]) 9/14/2017   • Right upper quadrant pain    • Screening for respiratory condition    • Tubal pregnancy without intrauterine pregnancy 6/15/2017   • Tuberculosis screening    • Urinary tract infectious disease      Cough   This is a new problem. The current episode started yesterday. The problem has been unchanged. The problem occurs constantly. The cough is productive of sputum. Associated symptoms include a fever, headaches, nasal congestion, postnasal drip, a sore throat and sweats. Nothing aggravates the symptoms. She has tried nothing for the symptoms. The treatment provided no relief.   Fever    This is a new problem. The current episode started yesterday. The problem occurs intermittently. The problem has been waxing and waning. The maximum temperature noted was 102 to 102.9 F. Associated symptoms include " congestion, coughing, headaches, muscle aches and a sore throat. She has tried acetaminophen, fluids and NSAIDs for the symptoms. The treatment provided mild relief.   Risk factors: sick contacts         The following portions of the patient's history were reviewed and updated as appropriate: allergies, current medications, past family history, past medical history, past social history, past surgical history and problem list.    Review of Systems   Constitutional: Positive for fever.   HENT: Positive for congestion, postnasal drip and sore throat.    Eyes: Negative.    Respiratory: Positive for cough.    Cardiovascular: Negative.    Gastrointestinal: Negative.    Endocrine: Negative.    Genitourinary: Negative.    Musculoskeletal: Negative.    Skin: Negative.    Allergic/Immunologic: Negative.    Neurological: Positive for headaches.   Hematological: Negative.    Psychiatric/Behavioral: Negative.        Objective   Physical Exam   Constitutional: She is oriented to person, place, and time. She appears well-developed and well-nourished. No distress.   HENT:   Head: Normocephalic and atraumatic.   Right Ear: Hearing, external ear and ear canal normal. A middle ear effusion is present.   Left Ear: Hearing, external ear and ear canal normal. A middle ear effusion is present.   Nose: Mucosal edema and rhinorrhea present.   Mouth/Throat: Uvula is midline and mucous membranes are normal. Posterior oropharyngeal erythema (with cobblestoning appearance noted) present. No tonsillar exudate.   Neck: Neck supple.   Cardiovascular: Regular rhythm and normal heart sounds. Tachycardia present. Exam reveals no gallop and no friction rub.   No murmur heard.  HR in low 100s   Pulmonary/Chest: Effort normal and breath sounds normal. No stridor. No respiratory distress. She has no wheezes. She has no rales.   Lymphadenopathy:     She has no cervical adenopathy.   Neurological: She is alert and oriented to person, place, and time.    Skin: Skin is warm and dry. No rash noted. She is not diaphoretic. No erythema. No pallor.   Psychiatric: She has a normal mood and affect. Her behavior is normal. Judgment and thought content normal.   Nursing note and vitals reviewed.      Assessment/Plan   Shelley was seen today for cough and fever.    Diagnoses and all orders for this visit:    Flu-like symptoms    Fever, unspecified fever cause  -     Influenza Antigen, Rapid - Swab, Nasopharynx    Anxiety    Insomnia, unspecified type    Other orders  -     brompheniramine-pseudoephedrine-DM 30-2-10 MG/5ML syrup; Take 5 mL by mouth 4 (Four) Times a Day As Needed for Congestion or Cough.  -     oseltamivir (TAMIFLU) 75 MG capsule; Take 1 capsule by mouth 2 (Two) Times a Day for 5 days.  -     hydrOXYzine (ATARAX) 25 MG tablet; Take 1 tablet by mouth 3 (Three) Times a Day As Needed for Anxiety (poison ivy).    she is swabbed for flu, although negative results which pt is made aware of in office, she does have signs and symptoms of flu and will be treated with tamiflu and bromfed prn symptom relief as above. She is advised to consider self contagious and not work until fever free x 24 hours without medicine  If symptoms persist or worsen she is asked to RTC for recheck  She is aware and is in agreement to this plan  Does not need work excuse stating that she is off until Friday, she works weekend [employer]  Refills given on atarax as above  All questions and concerns are addressed with understanding noted.

## 2020-09-08 ENCOUNTER — TRANSCRIBE ORDERS (OUTPATIENT)
Dept: LAB | Facility: HOSPITAL | Age: 30
End: 2020-09-08

## 2020-09-08 ENCOUNTER — LAB (OUTPATIENT)
Dept: LAB | Facility: HOSPITAL | Age: 30
End: 2020-09-08

## 2020-09-08 DIAGNOSIS — Z41.1 ENCOUNTER FOR BREAST AUGMENTATION: Primary | ICD-10-CM

## 2020-09-09 ENCOUNTER — LAB (OUTPATIENT)
Dept: LAB | Facility: OTHER | Age: 30
End: 2020-09-09

## 2020-09-09 ENCOUNTER — OFFICE VISIT (OUTPATIENT)
Dept: FAMILY MEDICINE CLINIC | Facility: CLINIC | Age: 30
End: 2020-09-09

## 2020-09-09 VITALS
SYSTOLIC BLOOD PRESSURE: 116 MMHG | HEIGHT: 63 IN | DIASTOLIC BLOOD PRESSURE: 60 MMHG | TEMPERATURE: 97.2 F | BODY MASS INDEX: 22.32 KG/M2 | OXYGEN SATURATION: 96 % | RESPIRATION RATE: 16 BRPM | WEIGHT: 126 LBS | HEART RATE: 80 BPM

## 2020-09-09 DIAGNOSIS — R19.7 DIARRHEA, UNSPECIFIED TYPE: ICD-10-CM

## 2020-09-09 DIAGNOSIS — R10.13 EPIGASTRIC PAIN: ICD-10-CM

## 2020-09-09 DIAGNOSIS — R10.13 EPIGASTRIC PAIN: Primary | ICD-10-CM

## 2020-09-09 LAB
ALBUMIN SERPL-MCNC: 4.5 G/DL (ref 3.5–5)
ALBUMIN/GLOB SERPL: 1.7 G/DL (ref 1.1–1.8)
ALP SERPL-CCNC: 56 U/L (ref 38–126)
ALT SERPL W P-5'-P-CCNC: 12 U/L
AMYLASE SERPL-CCNC: 80 U/L (ref 30–110)
ANION GAP SERPL CALCULATED.3IONS-SCNC: 8 MMOL/L (ref 5–15)
AST SERPL-CCNC: 24 U/L (ref 14–36)
BILIRUB SERPL-MCNC: 0.4 MG/DL (ref 0.2–1.3)
BUN SERPL-MCNC: 13 MG/DL (ref 7–23)
BUN/CREAT SERPL: 18.6 (ref 7–25)
CALCIUM SPEC-SCNC: 9.3 MG/DL (ref 8.4–10.2)
CHLORIDE SERPL-SCNC: 103 MMOL/L (ref 101–112)
CO2 SERPL-SCNC: 25 MMOL/L (ref 22–30)
CREAT SERPL-MCNC: 0.7 MG/DL (ref 0.52–1.04)
GFR SERPL CREATININE-BSD FRML MDRD: 98 ML/MIN/1.73 (ref 64–149)
GLOBULIN UR ELPH-MCNC: 2.7 GM/DL (ref 2.3–3.5)
GLUCOSE SERPL-MCNC: 90 MG/DL (ref 70–99)
POTASSIUM SERPL-SCNC: 4.2 MMOL/L (ref 3.4–5)
PROT SERPL-MCNC: 7.2 G/DL (ref 6.3–8.6)
SODIUM SERPL-SCNC: 136 MMOL/L (ref 137–145)

## 2020-09-09 PROCEDURE — 36415 COLL VENOUS BLD VENIPUNCTURE: CPT | Performed by: NURSE PRACTITIONER

## 2020-09-09 PROCEDURE — 86677 HELICOBACTER PYLORI ANTIBODY: CPT | Performed by: NURSE PRACTITIONER

## 2020-09-09 PROCEDURE — 99213 OFFICE O/P EST LOW 20 MIN: CPT | Performed by: NURSE PRACTITIONER

## 2020-09-09 PROCEDURE — 83690 ASSAY OF LIPASE: CPT | Performed by: NURSE PRACTITIONER

## 2020-09-09 PROCEDURE — 80053 COMPREHEN METABOLIC PANEL: CPT | Performed by: NURSE PRACTITIONER

## 2020-09-09 PROCEDURE — 82150 ASSAY OF AMYLASE: CPT | Performed by: NURSE PRACTITIONER

## 2020-09-09 NOTE — PROGRESS NOTES
"Subjective   Shelley Rico is a 29 y.o. female. Patient here today with complaints of Abdominal Pain (possible ulcer x 3 days )  Pt in office today with complaints of abdominal pain that started Sunday night. She reports the pain is intermittent and is a sharp stabbing, \"contraction\" like pain. She states she did have a mild headache Sunday night and then had diarrhea on Monday night up until Tuesday morning, no blood/mucus in stool. She states she has been eating a bland diet, but the pain persists and is not always associated with eating. She denies fever, vomiting, nausea, rash, back pain, bloating, or the pain radiating. She has tried Tums, but has not had any relief. 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     Vitals:    09/09/20 1104   BP: 116/60   Pulse: 80   Resp: 16   Temp: 97.2 °F (36.2 °C)   SpO2: 96%   Weight: 57.2 kg (126 lb)   Height: 160 cm (63\")   PainSc:   6   PainLoc: Abdomen  Comment: not contstant. sharp sudden pain     Body mass index is 22.32 kg/m².  Past Medical History:   Diagnosis Date   • Abdominal pain    • Alopecia    • Anxiety    • Contact dermatitis    • Disorder of gallbladder     gallstones    • Dysuria    • Gastroesophageal reflux disease    • Itch of skin    • Kidney stone    • Menometrorrhagia 9/14/2017   • Menometrorrhagia    • PONV (postoperative nausea and vomiting)    • Pruritic rash     vesicular rash      • Recurrent major depressive disorder, in partial remission (CMS/[facility]) 9/14/2017   • Right upper quadrant pain    • Screening for respiratory condition    • Tubal pregnancy without intrauterine pregnancy 6/15/2017   • Tuberculosis screening    • Urinary tract infectious disease      Abdominal Pain   This is a new problem. The current episode started in the past 7 days. The onset quality is sudden. The problem occurs intermittently. The problem has been waxing and waning. The pain is located in the epigastric " region. The pain is at a severity of 6/10. The pain is moderate. The quality of the pain is sharp (Cramping). The abdominal pain does not radiate. Associated symptoms include diarrhea and headaches. Pertinent negatives include no anorexia, arthralgias, belching, constipation, dysuria, fever, flatus, frequency, hematochezia, hematuria, melena, myalgias, nausea, vomiting or weight loss. The pain is aggravated by eating. The pain is relieved by nothing. She has tried antacids for the symptoms. The treatment provided no relief. Her past medical history is significant for GERD.        The following portions of the patient's history were reviewed and updated as appropriate: allergies, current medications, past family history, past medical history, past social history, past surgical history and problem list.    Review of Systems   Constitutional: Positive for appetite change. Negative for activity change, chills, diaphoresis, fatigue, fever, unexpected weight change and weight loss.   HENT: Negative for congestion, dental problem, drooling, ear discharge, ear pain, facial swelling, hearing loss, mouth sores, nosebleeds, postnasal drip, rhinorrhea, sinus pressure, sinus pain, sneezing, sore throat, tinnitus, trouble swallowing and voice change.    Eyes: Negative.    Respiratory: Negative.    Cardiovascular: Negative.    Gastrointestinal: Positive for abdominal pain and diarrhea. Negative for abdominal distention, anal bleeding, anorexia, blood in stool, constipation, flatus, hematochezia, melena, nausea, rectal pain and vomiting.   Endocrine: Negative.    Genitourinary: Negative.  Negative for dysuria, frequency and hematuria.   Musculoskeletal: Negative.  Negative for arthralgias and myalgias.   Skin: Negative.    Allergic/Immunologic: Negative.    Neurological: Positive for headaches. Negative for dizziness, tremors, seizures, syncope, facial asymmetry, speech difficulty, weakness, light-headedness and numbness.    Hematological: Negative.    Psychiatric/Behavioral: Negative.        Objective   Physical Exam   Constitutional: She is oriented to person, place, and time. Vital signs are normal. She appears well-developed and well-nourished. No distress.   HENT:   Head: Normocephalic and atraumatic.   Right Ear: External ear normal.   Left Ear: External ear normal.   Nose: Nose normal.   Mouth/Throat: Oropharynx is clear and moist. No oropharyngeal exudate.   Eyes: Pupils are equal, round, and reactive to light. Conjunctivae and EOM are normal. Right eye exhibits no discharge. Left eye exhibits no discharge. No scleral icterus.   Neck: Normal range of motion. Neck supple. No JVD present. No tracheal deviation present. No thyromegaly present.   Cardiovascular: Normal rate, regular rhythm, normal heart sounds and intact distal pulses. Exam reveals no gallop and no friction rub.   No murmur heard.  Pulmonary/Chest: Effort normal and breath sounds normal. No stridor. No respiratory distress. She has no wheezes. She has no rales.   Abdominal: Soft. Bowel sounds are normal. She exhibits no distension and no mass. There is no tenderness. There is no rebound and no guarding.   Reports pain intermittently to epigastric area, sharp, but does not nec hurt more with palpation, no bruit ausc on abd exam   Musculoskeletal: Normal range of motion. She exhibits no edema, tenderness or deformity.   Lymphadenopathy:     She has no cervical adenopathy.   Neurological: She is alert and oriented to person, place, and time. She has normal reflexes. She displays normal reflexes. No cranial nerve deficit. She exhibits normal muscle tone. Coordination normal.   Skin: Skin is warm and dry. Capillary refill takes less than 2 seconds. No rash noted. She is not diaphoretic. No erythema. No pallor.   Psychiatric: She has a normal mood and affect. Her behavior is normal. Judgment and thought content normal.   Nursing note and vitals  reviewed.      Assessment/Plan   Shelley was seen today for abdominal pain.    Diagnoses and all orders for this visit:    Epigastric pain  -     Amylase; Future  -     Lipase; Future  -     CBC & Differential; Future  -     Comprehensive metabolic panel; Future  -     Helicobacter Pylori, IgA IgG IgM; Future    Diarrhea, unspecified type  -     Amylase; Future  -     Lipase; Future  -     CBC & Differential; Future  -     Comprehensive metabolic panel; Future  -     Helicobacter Pylori, IgA IgG IgM; Future    Lab work as outlined, we will phone pt with results and treat accordingly   Continue BRAT diet and increase fluid intake  If symptoms worsen or persist, call office  All questions and concerns are addressed with understanding noted.   The patient is in agreement to above plan.

## 2020-09-10 LAB — LIPASE SERPL-CCNC: 17 U/L (ref 13–60)

## 2020-09-11 LAB
H PYLORI IGA SER IA-ACNC: <9 UNITS (ref 0–8.9)
H PYLORI IGG SER IA-ACNC: 0.56 INDEX VALUE (ref 0–0.79)
H PYLORI IGM SER-ACNC: <9 UNITS (ref 0–8.9)

## 2020-11-11 ENCOUNTER — LAB (OUTPATIENT)
Dept: LAB | Facility: HOSPITAL | Age: 30
End: 2020-11-11

## 2020-11-11 ENCOUNTER — TRANSCRIBE ORDERS (OUTPATIENT)
Dept: LAB | Facility: HOSPITAL | Age: 30
End: 2020-11-11

## 2020-11-11 DIAGNOSIS — Z41.1 ENCOUNTER FOR BREAST AUGMENTATION: Primary | ICD-10-CM

## 2020-11-11 PROCEDURE — 36415 COLL VENOUS BLD VENIPUNCTURE: CPT | Performed by: PLASTIC SURGERY

## 2020-11-11 PROCEDURE — 85025 COMPLETE CBC W/AUTO DIFF WBC: CPT | Performed by: PLASTIC SURGERY

## 2020-11-12 LAB
BASOPHILS # BLD AUTO: 0.02 10*3/MM3 (ref 0–0.2)
BASOPHILS NFR BLD AUTO: 0.4 % (ref 0–1.5)
DEPRECATED RDW RBC AUTO: 43.5 FL (ref 37–54)
EOSINOPHIL # BLD AUTO: 0.25 10*3/MM3 (ref 0–0.4)
EOSINOPHIL NFR BLD AUTO: 4.6 % (ref 0.3–6.2)
ERYTHROCYTE [DISTWIDTH] IN BLOOD BY AUTOMATED COUNT: 12.3 % (ref 12.3–15.4)
HCT VFR BLD AUTO: 35.9 % (ref 34–46.6)
HGB BLD-MCNC: 11.6 G/DL (ref 12–15.9)
IMM GRANULOCYTES # BLD AUTO: 0.01 10*3/MM3 (ref 0–0.05)
IMM GRANULOCYTES NFR BLD AUTO: 0.2 % (ref 0–0.5)
LYMPHOCYTES # BLD AUTO: 2.01 10*3/MM3 (ref 0.7–3.1)
LYMPHOCYTES NFR BLD AUTO: 37.1 % (ref 19.6–45.3)
MCH RBC QN AUTO: 31.1 PG (ref 26.6–33)
MCHC RBC AUTO-ENTMCNC: 32.3 G/DL (ref 31.5–35.7)
MCV RBC AUTO: 96.2 FL (ref 79–97)
MONOCYTES # BLD AUTO: 0.43 10*3/MM3 (ref 0.1–0.9)
MONOCYTES NFR BLD AUTO: 7.9 % (ref 5–12)
NEUTROPHILS NFR BLD AUTO: 2.7 10*3/MM3 (ref 1.7–7)
NEUTROPHILS NFR BLD AUTO: 49.8 % (ref 42.7–76)
NRBC BLD AUTO-RTO: 0 /100 WBC (ref 0–0.2)
PLATELET # BLD AUTO: 225 10*3/MM3 (ref 140–450)
PMV BLD AUTO: 11.1 FL (ref 6–12)
RBC # BLD AUTO: 3.73 10*6/MM3 (ref 3.77–5.28)
WBC # BLD AUTO: 5.42 10*3/MM3 (ref 3.4–10.8)

## 2021-01-12 ENCOUNTER — IMMUNIZATION (OUTPATIENT)
Dept: VACCINE CLINIC | Facility: HOSPITAL | Age: 31
End: 2021-01-12

## 2021-01-12 PROCEDURE — 91300 HC SARSCOV02 VAC 30MCG/0.3ML IM: CPT | Performed by: THORACIC SURGERY (CARDIOTHORACIC VASCULAR SURGERY)

## 2021-01-12 PROCEDURE — 0001A: CPT | Performed by: THORACIC SURGERY (CARDIOTHORACIC VASCULAR SURGERY)

## 2021-02-02 ENCOUNTER — IMMUNIZATION (OUTPATIENT)
Dept: VACCINE CLINIC | Facility: HOSPITAL | Age: 31
End: 2021-02-02

## 2021-02-02 PROCEDURE — 0002A: CPT | Performed by: NURSE PRACTITIONER

## 2021-02-02 PROCEDURE — 91300 HC SARSCOV02 VAC 30MCG/0.3ML IM: CPT | Performed by: NURSE PRACTITIONER

## 2021-06-09 ENCOUNTER — OFFICE VISIT (OUTPATIENT)
Dept: FAMILY MEDICINE CLINIC | Facility: CLINIC | Age: 31
End: 2021-06-09

## 2021-06-09 ENCOUNTER — LAB (OUTPATIENT)
Dept: LAB | Facility: OTHER | Age: 31
End: 2021-06-09

## 2021-06-09 VITALS
DIASTOLIC BLOOD PRESSURE: 80 MMHG | SYSTOLIC BLOOD PRESSURE: 118 MMHG | WEIGHT: 131.8 LBS | HEART RATE: 71 BPM | HEIGHT: 63 IN | RESPIRATION RATE: 16 BRPM | BODY MASS INDEX: 23.35 KG/M2 | OXYGEN SATURATION: 99 %

## 2021-06-09 DIAGNOSIS — N76.0 ACUTE VAGINITIS: ICD-10-CM

## 2021-06-09 DIAGNOSIS — G47.00 INSOMNIA, UNSPECIFIED TYPE: ICD-10-CM

## 2021-06-09 DIAGNOSIS — B00.2 ORAL HERPES SIMPLEX INFECTION: Primary | ICD-10-CM

## 2021-06-09 LAB
CANDIDA ALBICANS: NEGATIVE
GARDNERELLA VAGINALIS: POSITIVE
T VAGINALIS DNA VAG QL PROBE+SIG AMP: NEGATIVE

## 2021-06-09 PROCEDURE — 87510 GARDNER VAG DNA DIR PROBE: CPT | Performed by: NURSE PRACTITIONER

## 2021-06-09 PROCEDURE — 99214 OFFICE O/P EST MOD 30 MIN: CPT | Performed by: NURSE PRACTITIONER

## 2021-06-09 PROCEDURE — 87480 CANDIDA DNA DIR PROBE: CPT | Performed by: NURSE PRACTITIONER

## 2021-06-09 PROCEDURE — 87660 TRICHOMONAS VAGIN DIR PROBE: CPT | Performed by: NURSE PRACTITIONER

## 2021-06-09 RX ORDER — VALACYCLOVIR HYDROCHLORIDE 1 G/1
1000 TABLET, FILM COATED ORAL 2 TIMES DAILY
Qty: 14 TABLET | Refills: 0 | Status: SHIPPED | OUTPATIENT
Start: 2021-06-09 | End: 2021-06-16

## 2021-06-09 RX ORDER — CETIRIZINE HYDROCHLORIDE 10 MG/1
10 TABLET ORAL 2 TIMES DAILY
COMMUNITY

## 2021-06-09 RX ORDER — METRONIDAZOLE 500 MG/1
500 TABLET ORAL 2 TIMES DAILY
Qty: 14 TABLET | Refills: 0 | Status: SHIPPED | OUTPATIENT
Start: 2021-06-09 | End: 2021-06-16

## 2021-06-09 RX ORDER — HYDROXYZINE HYDROCHLORIDE 25 MG/1
25 TABLET, FILM COATED ORAL 3 TIMES DAILY PRN
Qty: 90 TABLET | Refills: 2 | Status: SHIPPED | OUTPATIENT
Start: 2021-06-09 | End: 2022-06-15 | Stop reason: SDUPTHER

## 2021-06-09 NOTE — PROGRESS NOTES
"Chief Complaint  Fever blister (2 weeks, has gone down but keeps coming back)    Subjective          Shelley Rico presents to [facility]  History of Present Illness    The patient presents today in the office for concerns regarding a fever blister.     She reports a 2-week history of a fever blister in the right corner of her mouth. She states that she was able to get rid of this but it returned. The patient wonders if wearing masks is exacerbating this. She did feel tingling prior to developing the blister. In the past, she was treated with Valtrex for a previous break out which worked well for her at that time.     The patient is also requesting a prescription for Atarax today to help with her anxiety and her sleep. 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 She has tried taking Benadryl but believes that this is making her feel groggy in the morning. She denies feeling groggy when taking Atarax in the past, but she states that she was working the night shift at that time anyway so she felt like this regardless. She also thinks the Atarax will help in regards to her itching. She has an intermittent skin rash on her forehead and chest, for which she has been seen by an allergist, Dr. Coyle. She has an upcoming scratch allergy test scheduled.      Additionally, she believes she may have a vaginal bacterial infection, as she has noticed an odor, particularly after swimming. She denies any itching or discharge at this time. She is status-post hysterectomy. She reports having bacterial vaginosis once in the past. She is amenable to being swabbed today to check for this.       Objective   Vital Signs:   /80   Pulse 71   Resp 16   Ht 160 cm (63\")   Wt 59.8 kg (131 lb 12.8 oz)   SpO2 99%   BMI 23.35 kg/m²     Physical Exam  Vitals and nursing note reviewed.   Constitutional:       " General: She is not in acute distress.     Appearance: Normal appearance. She is normal weight. She is not ill-appearing, toxic-appearing or diaphoretic.   HENT:      Head: Normocephalic and atraumatic.   Cardiovascular:      Rate and Rhythm: Normal rate.      Heart sounds: Normal heart sounds. No murmur heard.   No friction rub. No gallop.    Pulmonary:      Effort: Pulmonary effort is normal. No respiratory distress.      Breath sounds: Normal breath sounds. No stridor. No wheezing, rhonchi or rales.   Skin:     General: Skin is warm and dry.      Findings: Rash (she does have a periodic rash on her forehead and anterior chest.) present.      Comments: She does have a fever blister in the corner of her mouth on the right side.   Neurological:      Mental Status: She is alert and oriented to person, place, and time.   Psychiatric:         Mood and Affect: Mood normal.         Behavior: Behavior normal.         Thought Content: Thought content normal.         Judgment: Judgment normal.        Result Review :                 Assessment and Plan    Diagnoses and all orders for this visit:    1. Oral herpes simplex infection (Primary)    2. Acute vaginitis  -     Gardnerella vaginalis, Trichomonas vaginalis, Candida albicans, DNA - Swab, Vagina; Future    3. Insomnia, unspecified type    Other orders  -     hydrOXYzine (ATARAX) 25 MG tablet; Take 1 tablet by mouth 3 (Three) Times a Day As Needed for Itching, Allergies or Anxiety (sleep).  Dispense: 90 tablet; Refill: 2  -     valACYclovir (Valtrex) 1000 MG tablet; Take 1 tablet by mouth 2 (Two) Times a Day for 7 days.  Dispense: 14 tablet; Refill: 0    She will be treated with Valtrex. She will be given hydroxyzine to use three times a day PRN itching but mainly to use nightly for insomnia. If her symptoms should persist or worsen, she will return to the clinic for follow-up. Otherwise, I will see her back as scheduled for chronic conditions. I will order a vaginal  swab for BV and yeast. She is sent to the lab. She will swab herself and then she will be informed of results via phone in the next few days when the results return. She will be treated accordingly. All questions and concerns are addressed with understanding noted. The patient is in agreement to above plan.    I spent 33 minutes caring for Shelley on this date of service. This time includes time spent by me in the following activities:preparing for the visit, performing a medically appropriate examination and/or evaluation , counseling and educating the patient/family/caregiver, ordering medications, tests, or procedures and documenting information in the medical record  Follow Up   Return if symptoms worsen or fail to improve, for Recheck, or sooner as needed.  Patient was given instructions and counseling regarding her condition or for health maintenance advice. Please see specific information pulled into the AVS if appropriate.     Scribed for FLEX John by Shanell Seay.  06/09/21   10:01 CDT    I have personally performed the services described in this document as scribed by the above individual, and it is both accurate and complete.  FLEX John  6/9/2021  12:49 CDT

## 2021-10-14 ENCOUNTER — OFFICE VISIT (OUTPATIENT)
Dept: FAMILY MEDICINE CLINIC | Facility: CLINIC | Age: 31
End: 2021-10-14

## 2021-10-14 ENCOUNTER — LAB (OUTPATIENT)
Dept: LAB | Facility: OTHER | Age: 31
End: 2021-10-14

## 2021-10-14 VITALS
TEMPERATURE: 98 F | SYSTOLIC BLOOD PRESSURE: 110 MMHG | WEIGHT: 131.8 LBS | DIASTOLIC BLOOD PRESSURE: 80 MMHG | BODY MASS INDEX: 23.35 KG/M2 | HEART RATE: 74 BPM | HEIGHT: 63 IN | OXYGEN SATURATION: 100 %

## 2021-10-14 DIAGNOSIS — N76.0 ACUTE VAGINITIS: Primary | ICD-10-CM

## 2021-10-14 LAB
CLUE CELLS SPEC QL WET PREP: ABNORMAL
HYDATID CYST SPEC WET PREP: ABNORMAL
KOH PREP NAIL: NORMAL
T VAGINALIS SPEC QL WET PREP: ABNORMAL
WBC SPEC QL WET PREP: ABNORMAL
YEAST GENITAL QL WET PREP: ABNORMAL

## 2021-10-14 PROCEDURE — 99213 OFFICE O/P EST LOW 20 MIN: CPT | Performed by: NURSE PRACTITIONER

## 2021-10-14 PROCEDURE — 87220 TISSUE EXAM FOR FUNGI: CPT | Performed by: NURSE PRACTITIONER

## 2021-10-14 PROCEDURE — 87210 SMEAR WET MOUNT SALINE/INK: CPT | Performed by: NURSE PRACTITIONER

## 2021-10-14 RX ORDER — FLUCONAZOLE 200 MG/1
200 TABLET ORAL DAILY
Qty: 1 TABLET | Refills: 1 | Status: SHIPPED | OUTPATIENT
Start: 2021-10-14 | End: 2021-10-18

## 2021-10-14 RX ORDER — METRONIDAZOLE 500 MG/1
500 TABLET ORAL 2 TIMES DAILY
Qty: 14 TABLET | Refills: 0 | Status: SHIPPED | OUTPATIENT
Start: 2021-10-14 | End: 2021-10-18

## 2021-10-14 NOTE — PROGRESS NOTES
"Chief Complaint  Vaginitis (Pt states she has been itching on and off for about a week)    Subjective          She presents today for complaints of a yeast infection. She reports that she used soap from [employer] last week and began to experience vaginally itching. She notes that the irritation worsened to the point that she had to take a Benadryl which has helped, and now she has intermittent episodes of itching. She states the irritation was external in the labia region which caused her discomfort. She believes that this might be a bacterial or yeast infection. She states that the odor is not frequent but she notices a pungent smell when she engages in sexual intercourse. She notes that her discharge is relatively normal to her and is white in color. She states that although it is not appearing thick like cottage cheese, she is unsure of the consistency. She denies taking any over-the-counter medication.    She also notes that her last visit she had a bacterial infection. Additionally, she reports that she has had a hysterectomy and inquired about whether she is able to douche now.    Shelley Rico [facility]  History of Present Illness    Objective   Vital Signs:   /80   Pulse 74   Temp 98 °F (36.7 °C)   Ht 160 cm (63\")   Wt 59.8 kg (131 lb 12.8 oz)   SpO2 100%   BMI 23.35 kg/m²     Physical Exam  Vitals and nursing note reviewed. Exam conducted with a chaperone present.   Constitutional:       General: She is not in acute distress.     Appearance: Normal appearance. She is not ill-appearing, toxic-appearing or diaphoretic.   HENT:      Head: Normocephalic and atraumatic.   Cardiovascular:      Rate and Rhythm: Normal rate and regular rhythm.      Pulses: Normal pulses.      Heart sounds: Normal heart sounds. No murmur heard.  No friction rub. No gallop.    Pulmonary:      Effort: Pulmonary effort is normal. No respiratory " distress.      Breath sounds: Normal breath sounds. No stridor. No wheezing, rhonchi or rales.   Genitourinary:     Vagina: Vaginal discharge present.      Comments: Vaginal canal with a moderate amount of white, thick discharge noted, not malodorous. She does complain of itching vaginally. Hx hyst  Skin:     General: Skin is warm and dry.   Neurological:      Mental Status: She is alert and oriented to person, place, and time.   Psychiatric:         Mood and Affect: Mood normal.         Behavior: Behavior normal.         Thought Content: Thought content normal.         Judgment: Judgment normal.        Result Review :                 Assessment and Plan    Diagnoses and all orders for this visit:    1. Acute vaginitis (Primary)  -     Gardnerella vaginalis, Trichomonas vaginalis, Candida albicans, DNA - Swab, Vagina; Future  -     Cancel: Gardnerella vaginalis, Trichomonas vaginalis, Candida albicans, DNA - Swab, Vagina    Other orders  -     fluconazole (Diflucan) 200 MG tablet; Take 1 tablet by mouth Daily.  Dispense: 1 tablet; Refill: 1  -     metroNIDAZOLE (Flagyl) 500 MG tablet; Take 1 tablet by mouth 2 (Two) Times a Day for 7 days.  Dispense: 14 tablet; Refill: 0    I will treat her with Diflucan 200 mg and take as directed. She will take one today and then repeat in 3 days if needed. If her complaints should persist or worsen, she will call back and let me know. She was swabbed for BV and yeast today and will be informed of these results via phone. All questions and concerns are addressed with understanding verbalized. The patient is in agreement to above plan.    Lab results are back. She is informed she is pos for BV and will also be treated with flagyl as above. If symptoms persist she will RTC. Pt aware.     I spent 33 minutes caring for Shelley on this date of service. This time includes time spent by me in the following activities:preparing for the visit, reviewing tests, performing a medically  appropriate examination and/or evaluation , counseling and educating the patient/family/caregiver, ordering medications, tests, or procedures and documenting information in the medical record  Follow Up   Return if symptoms worsen or fail to improve, for Recheck, or sooner as needed.  Patient was given instructions and counseling regarding her condition or for health maintenance advice. Please see specific information pulled into the AVS if appropriate.     Transcribed from ambient dictation for FLEX John by Rehana Matamoros.  10/14/21   12:22 CDT    I have personally performed the services described in this document as transcribed by the above individual, and it is both accurate and complete.  FLEX John  10/14/2021  12:35 CDT

## 2021-10-18 ENCOUNTER — LAB (OUTPATIENT)
Dept: LAB | Facility: OTHER | Age: 31
End: 2021-10-18

## 2021-10-18 PROCEDURE — 87635 SARS-COV-2 COVID-19 AMP PRB: CPT | Performed by: PHYSICIAN ASSISTANT

## 2021-11-11 RX ORDER — METRONIDAZOLE 500 MG/1
500 TABLET ORAL 2 TIMES DAILY
Qty: 14 TABLET | Refills: 0 | Status: SHIPPED | OUTPATIENT
Start: 2021-11-11 | End: 2021-11-18

## 2022-06-14 RX ORDER — METHYLPREDNISOLONE 4 MG/1
21 TABLET ORAL DAILY
Qty: 21 TABLET | Refills: 0 | Status: SHIPPED | OUTPATIENT
Start: 2022-06-14 | End: 2022-06-15

## 2022-06-15 ENCOUNTER — OFFICE VISIT (OUTPATIENT)
Dept: FAMILY MEDICINE CLINIC | Facility: CLINIC | Age: 32
End: 2022-06-15

## 2022-06-15 VITALS
HEIGHT: 63 IN | SYSTOLIC BLOOD PRESSURE: 118 MMHG | DIASTOLIC BLOOD PRESSURE: 68 MMHG | WEIGHT: 125.6 LBS | BODY MASS INDEX: 22.25 KG/M2 | HEART RATE: 94 BPM | OXYGEN SATURATION: 100 %

## 2022-06-15 DIAGNOSIS — F41.9 ANXIETY: Chronic | ICD-10-CM

## 2022-06-15 DIAGNOSIS — G47.00 INSOMNIA, UNSPECIFIED TYPE: Chronic | ICD-10-CM

## 2022-06-15 DIAGNOSIS — L25.5 CONTACT DERMATITIS DUE TO PLANTS, EXCEPT FOOD, UNSPECIFIED CONTACT DERMATITIS TYPE: Primary | ICD-10-CM

## 2022-06-15 PROCEDURE — 99214 OFFICE O/P EST MOD 30 MIN: CPT | Performed by: NURSE PRACTITIONER

## 2022-06-15 RX ORDER — PREDNISONE 20 MG/1
20 TABLET ORAL 2 TIMES DAILY
Qty: 10 TABLET | Refills: 0 | Status: SHIPPED | OUTPATIENT
Start: 2022-06-15 | End: 2022-06-20

## 2022-06-15 RX ORDER — CLOTRIMAZOLE AND BETAMETHASONE DIPROPIONATE 10; .64 MG/G; MG/G
1 CREAM TOPICAL 2 TIMES DAILY
Qty: 45 G | Refills: 0 | Status: SHIPPED | OUTPATIENT
Start: 2022-06-15

## 2022-06-15 RX ORDER — BUSPIRONE HYDROCHLORIDE 10 MG/1
10 TABLET ORAL 2 TIMES DAILY PRN
Qty: 60 TABLET | Refills: 0 | Status: SHIPPED | OUTPATIENT
Start: 2022-06-15

## 2022-06-15 RX ORDER — HYDROXYZINE HYDROCHLORIDE 25 MG/1
25 TABLET, FILM COATED ORAL 3 TIMES DAILY PRN
Qty: 90 TABLET | Refills: 2 | Status: SHIPPED | OUTPATIENT
Start: 2022-06-15

## 2022-06-15 NOTE — PROGRESS NOTES
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 She wants something to help her relax and not be so tense.)    Subjective        Shelley Rico presents to [facility] [place]  History of Present Illness  Pt here today with complaints of itchy rash on her arms, legs and now chest and face which she noticed after she worked in her yard last Saturday. She states she has used benadryl cream and po as well as zantac but her symptoms continue. She takes atarax prn sleep at night. 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 She reports her heart rate has increased to 160s while doing so recently and she feels like this is from anxiety. She states she has used lotrisone in the past which has worked well for her when she had contact dermatitis in the past. Also she reports she had an allergic reaction at urgent care previously when she was given depo medrol injection. She does tell me however that she has had medrol dose pk as well as prednisone since then without problems.     Objective   Vital Signs:  /68   Pulse 94   Ht 160 cm (63\")   Wt 57 kg (125 lb 9.6 oz)   SpO2 100%   BMI 22.25 kg/m²   Estimated body mass index is 22.25 kg/m² as calculated from the following:    Height as of this encounter: 160 cm (63\").    Weight as of this encounter: 57 kg (125 lb 9.6 oz).    BMI is within normal parameters. No other follow-up for BMI required.      Physical Exam  Vitals and nursing note reviewed.   Constitutional:       General: She is not in acute distress.     Appearance: Normal appearance. She is normal weight. She is not ill-appearing, toxic-appearing or diaphoretic.   HENT:      Head: Normocephalic and atraumatic.   Cardiovascular:      Rate and Rhythm: Normal rate and regular " rhythm.      Heart sounds: Normal heart sounds. No murmur heard.    No friction rub. No gallop.   Pulmonary:      Effort: Pulmonary effort is normal.   Skin:     General: Skin is warm and dry.      Coloration: Skin is not jaundiced or pale.      Findings: Erythema and rash present. No bruising or lesion. Rash is macular, papular, urticarial and vesicular.      Comments: She has rash as described on her BUE and BLE as well as on her anterior chest and bilat ears    Neurological:      Mental Status: She is alert and oriented to person, place, and time.      Cranial Nerves: No cranial nerve deficit.      Motor: No weakness.      Coordination: Coordination normal.      Gait: Gait normal.   Psychiatric:         Attention and Perception: Attention normal.         Mood and Affect: Mood is anxious.         Speech: Speech normal.         Behavior: Behavior normal. Behavior is cooperative.         Thought Content: Thought content normal.         Cognition and Memory: Cognition normal.         Judgment: Judgment normal.      Comments: Appears well kempt         Result Review :                Assessment and Plan   Diagnoses and all orders for this visit:    1. Contact dermatitis due to plants, except food, unspecified contact dermatitis type (Primary)    2. Insomnia, unspecified type    3. Anxiety    Other orders  -     hydrOXYzine (ATARAX) 25 MG tablet; Take 1 tablet by mouth 3 (Three) Times a Day As Needed for Itching, Allergies or Anxiety (sleep).  Dispense: 90 tablet; Refill: 2  -     predniSONE (DELTASONE) 20 MG tablet; Take 1 tablet by mouth 2 (Two) Times a Day for 5 days.  Dispense: 10 tablet; Refill: 0  -     busPIRone (BUSPAR) 10 MG tablet; Take 1 tablet by mouth 2 (Two) Times a Day As Needed (anxiety).  Dispense: 60 tablet; Refill: 0  -     clotrimazole-betamethasone (Lotrisone) 1-0.05 % cream; Apply 1 application topically to the appropriate area as directed 2 (Two) Times a Day.  Dispense: 45 g; Refill: 0    she is  given po prednisone and lotrisone as above, to use as directed. She is given refills on atarax and will refill buspar for her to take during the day for anxiety prn. Advised not to take this at the same time as atarax. She states she had an allergic reaction to medrol inj in the past but has since tolerated po prednisone and medrol without problems. She is offered kenalog inj in office and advised to wait thereafter for approximately 30 minutes to make sure she did not have any adverse reaction but she declines injection today. Will return or call back if symptoms persist or worsen. She is offered triamcinolone cream/oint but states she has seen better improvement with lotrisone in the past with contact dermatitis. She is to RTC prn problems or in 6 months for recheck otherwise. Pt aware and in agreement to this plan. All questions and concerns addressed with understanding noted.        I spent 33 minutes caring for Shelley on this date of service. This time includes time spent by me in the following activities:preparing for the visit, performing a medically appropriate examination and/or evaluation , counseling and educating the patient/family/caregiver, ordering medications, tests, or procedures and documenting information in the medical record  Follow Up   Return in about 6 months (around 12/15/2022), or if symptoms worsen or fail to improve, for Recheck, or sooner as needed.  Patient was given instructions and counseling regarding her condition or for health maintenance advice. Please see specific information pulled into the AVS if appropriate.

## 2022-10-26 ENCOUNTER — OFFICE VISIT (OUTPATIENT)
Dept: FAMILY MEDICINE CLINIC | Facility: CLINIC | Age: 32
End: 2022-10-26

## 2022-10-26 ENCOUNTER — LAB (OUTPATIENT)
Dept: LAB | Facility: OTHER | Age: 32
End: 2022-10-26

## 2022-10-26 VITALS
OXYGEN SATURATION: 97 % | SYSTOLIC BLOOD PRESSURE: 120 MMHG | HEIGHT: 63 IN | HEART RATE: 79 BPM | DIASTOLIC BLOOD PRESSURE: 72 MMHG | TEMPERATURE: 97.2 F | BODY MASS INDEX: 23.6 KG/M2 | WEIGHT: 133.2 LBS

## 2022-10-26 DIAGNOSIS — Z11.59 NEED FOR HEPATITIS C SCREENING TEST: ICD-10-CM

## 2022-10-26 DIAGNOSIS — Z83.79 FAMILY HISTORY OF ULCERATIVE COLITIS: ICD-10-CM

## 2022-10-26 DIAGNOSIS — R19.7 DIARRHEA, UNSPECIFIED TYPE: ICD-10-CM

## 2022-10-26 DIAGNOSIS — L65.9 ALOPECIA: ICD-10-CM

## 2022-10-26 DIAGNOSIS — R63.5 WEIGHT GAIN: ICD-10-CM

## 2022-10-26 DIAGNOSIS — R10.84 GENERALIZED ABDOMINAL PAIN: Primary | ICD-10-CM

## 2022-10-26 DIAGNOSIS — R10.84 GENERALIZED ABDOMINAL PAIN: ICD-10-CM

## 2022-10-26 LAB
ALBUMIN SERPL-MCNC: 4.8 G/DL (ref 3.5–5)
ALBUMIN/GLOB SERPL: 1.7 G/DL (ref 1.1–1.8)
ALP SERPL-CCNC: 65 U/L (ref 38–126)
ALT SERPL W P-5'-P-CCNC: 19 U/L
ANION GAP SERPL CALCULATED.3IONS-SCNC: 9 MMOL/L (ref 5–15)
AST SERPL-CCNC: 28 U/L (ref 14–36)
BASOPHILS # BLD AUTO: 0.01 10*3/MM3 (ref 0–0.2)
BASOPHILS NFR BLD AUTO: 0.2 % (ref 0–1.5)
BILIRUB SERPL-MCNC: 0.5 MG/DL (ref 0.2–1.3)
BUN SERPL-MCNC: 9 MG/DL (ref 7–23)
BUN/CREAT SERPL: 13.4 (ref 7–25)
CALCIUM SPEC-SCNC: 9.5 MG/DL (ref 8.4–10.2)
CHLORIDE SERPL-SCNC: 102 MMOL/L (ref 101–112)
CO2 SERPL-SCNC: 28 MMOL/L (ref 22–30)
CREAT SERPL-MCNC: 0.67 MG/DL (ref 0.52–1.04)
DEPRECATED RDW RBC AUTO: 42.2 FL (ref 37–54)
EGFRCR SERPLBLD CKD-EPI 2021: 117.8 ML/MIN/1.73
EOSINOPHIL # BLD AUTO: 0.15 10*3/MM3 (ref 0–0.4)
EOSINOPHIL NFR BLD AUTO: 2.6 % (ref 0.3–6.2)
ERYTHROCYTE [DISTWIDTH] IN BLOOD BY AUTOMATED COUNT: 12.4 % (ref 12.3–15.4)
GLOBULIN UR ELPH-MCNC: 2.8 GM/DL (ref 2.3–3.5)
GLUCOSE SERPL-MCNC: 73 MG/DL (ref 70–99)
HCT VFR BLD AUTO: 40.6 % (ref 34–46.6)
HGB BLD-MCNC: 13.1 G/DL (ref 12–15.9)
LYMPHOCYTES # BLD AUTO: 1.88 10*3/MM3 (ref 0.7–3.1)
LYMPHOCYTES NFR BLD AUTO: 32.1 % (ref 19.6–45.3)
MCH RBC QN AUTO: 30.8 PG (ref 26.6–33)
MCHC RBC AUTO-ENTMCNC: 32.3 G/DL (ref 31.5–35.7)
MCV RBC AUTO: 95.3 FL (ref 79–97)
MONOCYTES # BLD AUTO: 0.43 10*3/MM3 (ref 0.1–0.9)
MONOCYTES NFR BLD AUTO: 7.4 % (ref 5–12)
NEUTROPHILS NFR BLD AUTO: 3.38 10*3/MM3 (ref 1.7–7)
NEUTROPHILS NFR BLD AUTO: 57.7 % (ref 42.7–76)
PLATELET # BLD AUTO: 252 10*3/MM3 (ref 140–450)
PMV BLD AUTO: 10.2 FL (ref 6–12)
POTASSIUM SERPL-SCNC: 3.9 MMOL/L (ref 3.4–5)
PROT SERPL-MCNC: 7.6 G/DL (ref 6.3–8.6)
RBC # BLD AUTO: 4.26 10*6/MM3 (ref 3.77–5.28)
SODIUM SERPL-SCNC: 139 MMOL/L (ref 137–145)
WBC NRBC COR # BLD: 5.85 10*3/MM3 (ref 3.4–10.8)

## 2022-10-26 PROCEDURE — 80050 GENERAL HEALTH PANEL: CPT | Performed by: NURSE PRACTITIONER

## 2022-10-26 PROCEDURE — 84439 ASSAY OF FREE THYROXINE: CPT | Performed by: NURSE PRACTITIONER

## 2022-10-26 PROCEDURE — 86231 EMA EACH IG CLASS: CPT | Performed by: NURSE PRACTITIONER

## 2022-10-26 PROCEDURE — 84481 FREE ASSAY (FT-3): CPT | Performed by: NURSE PRACTITIONER

## 2022-10-26 PROCEDURE — 36415 COLL VENOUS BLD VENIPUNCTURE: CPT | Performed by: NURSE PRACTITIONER

## 2022-10-26 PROCEDURE — 86258 DGP ANTIBODY EACH IG CLASS: CPT | Performed by: NURSE PRACTITIONER

## 2022-10-26 PROCEDURE — 99214 OFFICE O/P EST MOD 30 MIN: CPT | Performed by: NURSE PRACTITIONER

## 2022-10-26 PROCEDURE — 86803 HEPATITIS C AB TEST: CPT | Performed by: NURSE PRACTITIONER

## 2022-10-26 PROCEDURE — 86364 TISS TRNSGLTMNASE EA IG CLAS: CPT | Performed by: NURSE PRACTITIONER

## 2022-10-26 PROCEDURE — 82784 ASSAY IGA/IGD/IGG/IGM EACH: CPT | Performed by: NURSE PRACTITIONER

## 2022-10-26 NOTE — PROGRESS NOTES
Chief Complaint  Labs Only (Wanting labs drawn. Feeling fatigue, no energy), Alopecia (Hair loss), and GI Problem (Sensitive stomach. Gallbladder out years ago. But now after eating gets terrible stomach pain and has to use the restroom.  )    Subjective        Shelley Rico presents to [facility] [place]  History of Present Illness      The patient presents today for labs.    The patient reports that she had COVID-19 in 01/2022 and she has noticed that her hair is falling out tremendously.  She adds that she is taking biotin gummies. She adds that she has been taking these for approximately 2 weeks.    The patient adds that she would like to have her thyroid checked because she has a family history of hypothyroidism as well as alopecia and weight gain.     The patient reports that she had her gallbladder removed in 2013. The patient adds that she is used to having diarrhea and loose stools after she ate, but now it is getting worse. She adds that she has stomach pain that feels like someone has stabbed her and she has to go right away to the bathroom. The patient adds that she has not had any blood in her stool. She notes intermittent mucus. The patient adds that she has a minimum of 4 to 5 bowel movements per day. She adds that this has been going on for a couple of years, but the pain has gotten worse. She states her brother had ulcerative colitis.    The patient states she has never had a colonoscopy. The patient adds that she has been getting hemorrhoids a lot recently.  She adds that she had a hysterectomy when she was 29 years old. The patient adds that she feels like her stomach bloats for no reason. She adds that the top part of her stomach has a little rounded spot and she feels so full of air.  She adds that whenever she lays down, it seems to go away. The patient denies vomiting. She adds that she will belch frequently. The patient adds that when she is  "bloated, it is more in the upper part of her abdomen, but whenever she goes to the bathroom, it is more in the lower part of her abdomen. She adds that she feels like she is gaining weight.    The patient adds that she received her influenza vaccine at work on 10/04/2022.    Objective   Vital Signs:  /72   Pulse 79   Temp 97.2 °F (36.2 °C)   Ht 160 cm (63\")   Wt 60.4 kg (133 lb 3.2 oz)   SpO2 97%   BMI 23.60 kg/m²   Estimated body mass index is 23.6 kg/m² as calculated from the following:    Height as of this encounter: 160 cm (63\").    Weight as of this encounter: 60.4 kg (133 lb 3.2 oz).    BMI is within normal parameters. No other follow-up for BMI required.      Physical Exam  Vitals and nursing note reviewed.   Constitutional:       General: She is not in acute distress.     Appearance: Normal appearance. She is normal weight. She is not ill-appearing, toxic-appearing or diaphoretic.   HENT:      Head: Normocephalic and atraumatic.   Cardiovascular:      Rate and Rhythm: Normal rate and regular rhythm.      Heart sounds: Normal heart sounds. No murmur heard.    No friction rub. No gallop.   Pulmonary:      Effort: Pulmonary effort is normal. No respiratory distress.      Breath sounds: Normal breath sounds. No stridor. No wheezing, rhonchi or rales.   Abdominal:      General: Abdomen is flat. Bowel sounds are normal.      Palpations: Abdomen is soft.      Tenderness: There is no abdominal tenderness.      Comments: Abdomen is soft, bowel sounds positive. She is nontender to palpation throughout exam.   Skin:     General: Skin is warm and dry.      Coloration: Skin is not jaundiced or pale.      Findings: No bruising, erythema, lesion or rash.   Neurological:      Mental Status: She is alert and oriented to person, place, and time.      Cranial Nerves: No cranial nerve deficit.      Motor: No weakness.      Coordination: Coordination normal.      Gait: Gait normal.   Psychiatric:         Mood and " Affect: Mood normal.         Behavior: Behavior normal.         Thought Content: Thought content normal.         Judgment: Judgment normal.        Result Review :    Common labs    Common Labs 10/26/22 10/26/22    [id number]   Glucose  73   BUN  9   Creatinine  0.67   Sodium  139   Potassium  3.9   Chloride  102   Calcium  9.5   Albumin  4.80   Total Bilirubin  0.5   Alkaline Phosphatase  65   AST (SGOT)  28   ALT (SGPT)  19   WBC 5.85    Hemoglobin 13.1    Hematocrit 40.6    Platelets 252                      Assessment and Plan   Diagnoses and all orders for this visit:    1. Generalized abdominal pain (Primary)  -     Gastrointestinal Panel, PCR - Stool, Per Rectum; Future  -     CBC & Differential; Future  -     Comprehensive metabolic panel; Future  -     Celiac Comprehensive Panel; Future  -     XR Abdomen Flat & Upright    2. Need for hepatitis C screening test  -     Hepatitis C Antibody; Future    3. Diarrhea, unspecified type  -     Gastrointestinal Panel, PCR - Stool, Per Rectum; Future  -     CBC & Differential; Future  -     Comprehensive metabolic panel; Future  -     Celiac Comprehensive Panel; Future  -     XR Abdomen Flat & Upright    4. Alopecia  -     TSH; Future  -     T4, free; Future  -     T3, free; Future    5. Weight gain  -     TSH; Future  -     T4, free; Future  -     T3, free; Future    6. Family history of ulcerative colitis  Comments:  brother    I will obtain labs as above as well as flattened upright abdominal x-ray as above and she will be informed of results by phone. If all negative, may need referral on to GI, especially since she has a family history of ulcerative colitis in her brother, may need colonoscopy and further evaluation by GI. We will wait for this referral today, however, obtain lab results and x-ray results first and then move forward with treatment or referral as necessary. Certainly, if her symptoms persist or worsen prior to obtaining test results, she will call  back sooner. All questions and concerns are addressed with understanding noted. The patient is aware and in agreement with the above plan.     I spent 33 minutes caring for Shelley on this date of service. This time includes time spent by me in the following activities:preparing for the visit, reviewing tests, performing a medically appropriate examination and/or evaluation , counseling and educating the patient/family/caregiver, ordering medications, tests, or procedures and documenting information in the medical record  Follow Up   Return if symptoms worsen or fail to improve, after tests for results, for Recheck, or sooner as needed.  Patient was given instructions and counseling regarding her condition or for health maintenance advice. Please see specific information pulled into the AVS if appropriate.     Transcribed from ambient dictation for FLEX John by Ana Paris.  10/26/22   16:28 CDT    Patient or patient representative verbalized consent to the visit recording.  I have personally performed the services described in this document as transcribed by the above individual, and it is both accurate and complete.  FLEX John  10/27/2022  12:39 CDT

## 2022-10-27 LAB
HCV AB SER DONR QL: NORMAL
T3FREE SERPL-MCNC: 3.36 PG/ML (ref 2–4.4)
T4 FREE SERPL-MCNC: 1.14 NG/DL (ref 0.93–1.7)
TSH SERPL DL<=0.05 MIU/L-ACNC: 2.06 UIU/ML (ref 0.27–4.2)

## 2022-10-28 LAB
ENDOMYSIUM IGA SER QL: NEGATIVE
GLIADIN PEPTIDE IGA SER-ACNC: 8 UNITS (ref 0–19)
GLIADIN PEPTIDE IGG SER-ACNC: 2 UNITS (ref 0–19)
IGA SERPL-MCNC: 76 MG/DL (ref 87–352)
TTG IGA SER-ACNC: <2 U/ML (ref 0–3)
TTG IGG SER-ACNC: 8 U/ML (ref 0–5)

## 2022-10-31 ENCOUNTER — LAB (OUTPATIENT)
Dept: LAB | Facility: OTHER | Age: 32
End: 2022-10-31

## 2022-10-31 DIAGNOSIS — R19.7 DIARRHEA, UNSPECIFIED TYPE: ICD-10-CM

## 2022-10-31 DIAGNOSIS — R10.84 GENERALIZED ABDOMINAL PAIN: ICD-10-CM

## 2022-10-31 LAB

## 2022-10-31 PROCEDURE — 87507 IADNA-DNA/RNA PROBE TQ 12-25: CPT | Performed by: NURSE PRACTITIONER

## 2022-11-01 DIAGNOSIS — R10.84 GENERALIZED ABDOMINAL PAIN: Primary | ICD-10-CM

## 2022-11-01 DIAGNOSIS — R19.7 DIARRHEA, UNSPECIFIED TYPE: ICD-10-CM

## 2022-11-04 ENCOUNTER — OFFICE VISIT (OUTPATIENT)
Dept: GASTROENTEROLOGY | Facility: CLINIC | Age: 32
End: 2022-11-04

## 2022-11-04 ENCOUNTER — LAB (OUTPATIENT)
Dept: LAB | Facility: OTHER | Age: 32
End: 2022-11-04

## 2022-11-04 VITALS
BODY MASS INDEX: 23.74 KG/M2 | WEIGHT: 134 LBS | HEIGHT: 63 IN | SYSTOLIC BLOOD PRESSURE: 110 MMHG | HEART RATE: 81 BPM | DIASTOLIC BLOOD PRESSURE: 64 MMHG

## 2022-11-04 DIAGNOSIS — R14.0 ABDOMINAL BLOATING: ICD-10-CM

## 2022-11-04 DIAGNOSIS — R19.7 DIARRHEA, UNSPECIFIED TYPE: ICD-10-CM

## 2022-11-04 DIAGNOSIS — R10.84 GENERALIZED ABDOMINAL PAIN: ICD-10-CM

## 2022-11-04 DIAGNOSIS — Z83.79 FAMILY HISTORY OF ULCERATIVE COLITIS: ICD-10-CM

## 2022-11-04 DIAGNOSIS — R19.7 DIARRHEA, UNSPECIFIED TYPE: Primary | ICD-10-CM

## 2022-11-04 PROCEDURE — 86003 ALLG SPEC IGE CRUDE XTRC EA: CPT | Performed by: PHYSICIAN ASSISTANT

## 2022-11-04 PROCEDURE — 86258 DGP ANTIBODY EACH IG CLASS: CPT | Performed by: PHYSICIAN ASSISTANT

## 2022-11-04 PROCEDURE — 99214 OFFICE O/P EST MOD 30 MIN: CPT | Performed by: PHYSICIAN ASSISTANT

## 2022-11-04 PROCEDURE — 86364 TISS TRNSGLTMNASE EA IG CLAS: CPT | Performed by: PHYSICIAN ASSISTANT

## 2022-11-04 RX ORDER — DICYCLOMINE HCL 20 MG
20 TABLET ORAL
Qty: 90 TABLET | Refills: 5 | Status: SHIPPED | OUTPATIENT
Start: 2022-11-04 | End: 2022-12-04

## 2022-11-04 RX ORDER — SODIUM, POTASSIUM,MAG SULFATES 17.5-3.13G
1 SOLUTION, RECONSTITUTED, ORAL ORAL EVERY 12 HOURS
Qty: 354 ML | Refills: 0 | Status: SHIPPED | OUTPATIENT
Start: 2022-11-04 | End: 2022-12-01 | Stop reason: HOSPADM

## 2022-11-04 RX ORDER — DEXTROSE AND SODIUM CHLORIDE 5; .45 G/100ML; G/100ML
30 INJECTION, SOLUTION INTRAVENOUS CONTINUOUS PRN
Status: CANCELLED | OUTPATIENT
Start: 2022-11-04

## 2022-11-04 NOTE — PROGRESS NOTES
[facility]      Chief Complaint:   No chief complaint on file.      Subjective     HPI:   Patient presents for evaluation due to chronic diarrhea and abdominal pain.  Patient states that she has had diarrhea/loose stool after eating ever since she had her gallbladder removed in 2013.  She states that over the past 1 year symptoms have significantly worsened with increasing pain, increasing frequency of diarrhea and nocturnal diarrhea.  Patient denies blood in her stool but reports she does see mucus in her stool at times.  Patient reports bloating, nausea and occasional heartburn depending upon what she eats.  Patient denies dysphagia.  Patient states fried foods and dairy significantly worsen all of her symptoms.    Patient had x-ray of the abdomen completed by PCP which showed a moderate amount of retained feces in the colon, no bowel obstruction and prior cholecystectomy.  She had normal stool studies.  CBC and CMP are normal.  She has a slightly elevated tissue transglutaminase IgG of 8, IgA slightly decreased at 76 and the remainder of the celiac panel is normal.    Patient's older brother was diagnosed with ulcerative colitis approximately 1 year ago.  She also notes a distant family history of colon cancer.    Plan: Patient will have EGD and colonoscopy with small bowel biopsies for further evaluation.  Recurrent GI distress panel ordered.  I will start her on Bentyl 20 mg 4 times daily as needed for abdominal cramping and diarrhea.  She will follow-up after endoscopy has been completed for results and further recommendations based on findings.    Past Medical History:   Past Medical History:   Diagnosis Date   • Abdominal pain    • Alopecia    • Anxiety    • Contact dermatitis    • Disorder of gallbladder     gallstones    • Dysuria    • Gastroesophageal reflux disease    • Itch of skin    • Kidney stone    • Menometrorrhagia 9/14/2017   • Menometrorrhagia    • PONV  (postoperative nausea and vomiting)    • Pruritic rash     vesicular rash      • Recurrent major depressive disorder, in partial remission ([facility]) 2017   • Right upper quadrant pain    • Screening for respiratory condition    • Tubal pregnancy without intrauterine pregnancy 6/15/2017   • Tuberculosis screening    • Urinary tract infectious disease        Past Surgical History:  Past Surgical History:   Procedure Laterality Date   • BREAST AUGMENTATION  2020   •  SECTION     • CHOLECYSTECTOMY  2014    and intraoperative cholangiogram., interpreted intraoperatively under fluoroscopy   • INJECTION OF MEDICATION  2014    Depo Medrol(1)   • LAPAROSCOPIC ASSISTED VAGINAL HYSTERECTOMY N/A 10/14/2019    Procedure: TOTAL LAPAROSCOPIC HYSTERECTOMY, CYSTOSCOPY;  Surgeon: Rony Mariee DO;  Location: [facility];  Service: Obstetrics/Gynecology   • LAPAROSCOPIC EXPLORATION FOR ECTOPIC PREGNANCY Bilateral 2017    Procedure: LAPAROSCOPIC EXPLORATION FOR ECTOPIC PREGNANCY;  Surgeon: Duy Jaramillo MD;  Location: [facility];  Service:    • SALPINGECTOMY Bilateral 2017    Procedure: SALPINGECTOMY LAPAROSCOPIC;  Surgeon: Duy Jarmaillo MD;  Location: [facility];  Service:    • TONSILLECTOMY     • TUBAL ABDOMINAL LIGATION         Family History:  Family History   Problem Relation Age of Onset   • Hypertension Father    • Cancer Maternal Grandmother    • Heart disease Maternal Grandmother    • Thyroid disease Maternal Grandmother    • Cancer Maternal Grandfather    • Heart disease Maternal Grandfather    • Cancer Paternal Grandfather        Social History:   reports that she has quit smoking. She has never used smokeless tobacco. She reports current alcohol use. She reports that she does not use drugs.    Medications:   Prior to Admission medications    Medication Sig Start Date End Date Taking? Authorizing Provider   busPIRone (BUSPAR) 10 MG tablet Take 1 tablet by mouth 2 (Two) Times a Day As  "Needed (anxiety). 6/15/22   Mable Villanueva APRN   cetirizine (zyrTEC) 10 MG tablet Take 10 mg by mouth 2 (Two) Times a Day.    Provider, MD kitty Mcintoshmazole-betamethasone (Lotrisone) 1-0.05 % cream Apply 1 application topically to the appropriate area as directed 2 (Two) Times a Day. 6/15/22   Mable Villanueva APRN   dicyclomine (BENTYL) 20 MG tablet Take 1 tablet by mouth 4 (Four) Times a Day Before Meals & at Bedtime As Needed (cramping) for up to 30 days. 11/4/22 12/4/22  Lizabeth Miranda PA-C   hydrOXYzine (ATARAX) 25 MG tablet Take 1 tablet by mouth 3 (Three) Times a Day As Needed for Itching, Allergies or Anxiety (sleep). 6/15/22   Mable Villanueva APRN   sodium-potassium-magnesium sulfates (Suprep Bowel Prep Kit) 17.5-3.13-1.6 GM/177ML solution oral solution Take 1 bottle by mouth Every 12 (Twelve) Hours. 11/4/22   Lizabeth Miranda PA-C       Allergies:  Bactrim [sulfamethoxazole-trimethoprim], Depo-medrol [methylprednisolone sodium succ], Morphine and related, Wellbutrin [bupropion], and Penicillins    ROS:    Review of Systems   Constitutional: Negative.  Negative for fever.   HENT: Negative.  Negative for trouble swallowing.    Eyes: Negative.    Respiratory: Negative.  Negative for shortness of breath.    Cardiovascular: Negative.  Negative for chest pain.   Gastrointestinal: Positive for abdominal distention, abdominal pain, diarrhea and nausea. Negative for anal bleeding, blood in stool, constipation, rectal pain and vomiting.   Endocrine: Negative.    Genitourinary: Negative.    Skin: Negative.    Neurological: Negative.    Hematological: Negative.    Psychiatric/Behavioral: Negative.      Objective     Blood pressure 110/64, pulse 81, height 160 cm (63\"), weight 60.8 kg (134 lb), last menstrual period 10/02/2019, unknown if currently breastfeeding.    Physical Exam  Vitals and nursing note reviewed.   Constitutional:       Appearance: Normal appearance. She is normal weight.   HENT:      " Head: Normocephalic and atraumatic.   Eyes:      Conjunctiva/sclera: Conjunctivae normal.   Cardiovascular:      Rate and Rhythm: Normal rate and regular rhythm.   Pulmonary:      Effort: Pulmonary effort is normal.      Breath sounds: Normal breath sounds.   Abdominal:      General: Bowel sounds are normal.      Palpations: Abdomen is soft.      Tenderness: There is no abdominal tenderness. There is no guarding or rebound.   Skin:     General: Skin is warm.      Coloration: Skin is not jaundiced.   Neurological:      General: No focal deficit present.      Mental Status: She is alert.   Psychiatric:         Behavior: Behavior normal.          Assessment & Plan   Diagnoses and all orders for this visit:    1. Diarrhea, unspecified type (Primary)  -     Case Request; Standing  -     dextrose 5 % and sodium chloride 0.45 % infusion  -     Case Request  -     Recurrent Gastrointestinal Distress; Future    2. Family history of ulcerative colitis  -     Case Request; Standing  -     dextrose 5 % and sodium chloride 0.45 % infusion  -     Case Request  -     Recurrent Gastrointestinal Distress; Future    3. Abdominal bloating  -     Case Request; Standing  -     dextrose 5 % and sodium chloride 0.45 % infusion  -     Case Request  -     Recurrent Gastrointestinal Distress; Future    4. Generalized abdominal pain  -     Case Request; Standing  -     dextrose 5 % and sodium chloride 0.45 % infusion  -     Case Request  -     Recurrent Gastrointestinal Distress; Future    Other orders  -     Follow Anesthesia Guidelines / Protocol; Future  -     Obtain Informed Consent; Future  -     Implement Anesthesia Orders Day of Procedure; Standing  -     Obtain Informed Consent; Standing  -     POC Glucose Once; Standing  -     Insert Peripheral IV; Standing  -     sodium-potassium-magnesium sulfates (Suprep Bowel Prep Kit) 17.5-3.13-1.6 GM/177ML solution oral solution; Take 1 bottle by mouth Every 12 (Twelve) Hours.  Dispense: 354  mL; Refill: 0  -     dicyclomine (BENTYL) 20 MG tablet; Take 1 tablet by mouth 4 (Four) Times a Day Before Meals & at Bedtime As Needed (cramping) for up to 30 days.  Dispense: 90 tablet; Refill: 5        ESOPHAGOGASTRODUODENOSCOPY-small bowel bx (N/A), COLONOSCOPY-look at TI with bx (N/A)     Diagnosis Plan   1. Diarrhea, unspecified type  Case Request    dextrose 5 % and sodium chloride 0.45 % infusion    Case Request    Recurrent Gastrointestinal Distress      2. Family history of ulcerative colitis  Case Request    dextrose 5 % and sodium chloride 0.45 % infusion    Case Request    Recurrent Gastrointestinal Distress      3. Abdominal bloating  Case Request    dextrose 5 % and sodium chloride 0.45 % infusion    Case Request    Recurrent Gastrointestinal Distress      4. Generalized abdominal pain  Case Request    dextrose 5 % and sodium chloride 0.45 % infusion    Case Request    Recurrent Gastrointestinal Distress          Anticipated Surgical Procedure:  Orders Placed This Encounter   Procedures   • Recurrent Gastrointestinal Distress     Standing Status:   Future     Standing Expiration Date:   11/4/2023     Order Specific Question:   Release to patient     Answer:   Routine Release   • Obtain Informed Consent     Standing Status:   Future     Order Specific Question:   Informed Consent Given For     Answer:   egd and colonoscopy       The risks, benefits, and alternatives of this procedure have been discussed with the patient or the responsible party- the patient understands and agrees to proceed.

## 2022-11-07 LAB
GLIADIN PEPTIDE IGA SER-ACNC: 7 UNITS (ref 0–19)
GLIADIN PEPTIDE IGG SER-ACNC: 2 UNITS (ref 0–19)
TTG IGA SER-ACNC: <2 U/ML (ref 0–3)
TTG IGG SER-ACNC: 11 U/ML (ref 0–5)

## 2022-11-13 LAB
CODFISH IGE QN: <0.1 KU/L
CONV CLASS DESCRIPTION: NORMAL
COW MILK IGE QN: <0.1 KU/L
EGG WHITE IGE QN: <0.1 KU/L
GLUTEN IGE QN: <0.1 KU/L
HAZELNUT IGE QN: <0.1 KU/L
PEANUT IGE QN: <0.1 KU/L
SCALLOP IGE QN: <0.1 KU/L
SESAME SEED IGE QN: <0.1 KU/L
SHRIMP IGE QN: <0.1 KU/L
SOYBEAN IGE QN: <0.1 KU/L
WALNUT IGE QN: <0.1 KU/L
WHEAT IGE QN: <0.1 KU/L

## 2022-11-30 RX ORDER — DEXTROSE AND SODIUM CHLORIDE 5; .45 G/100ML; G/100ML
30 INJECTION, SOLUTION INTRAVENOUS CONTINUOUS PRN
Status: DISCONTINUED | OUTPATIENT
Start: 2022-11-30 | End: 2022-12-01 | Stop reason: HOSPADM

## 2022-12-01 ENCOUNTER — ANESTHESIA (OUTPATIENT)
Dept: GASTROENTEROLOGY | Facility: HOSPITAL | Age: 32
End: 2022-12-01

## 2022-12-01 ENCOUNTER — ANESTHESIA EVENT (OUTPATIENT)
Dept: GASTROENTEROLOGY | Facility: HOSPITAL | Age: 32
End: 2022-12-01

## 2022-12-01 ENCOUNTER — HOSPITAL ENCOUNTER (OUTPATIENT)
Facility: HOSPITAL | Age: 32
Setting detail: HOSPITAL OUTPATIENT SURGERY
Discharge: HOME | End: 2022-12-01
Attending: INTERNAL MEDICINE | Admitting: INTERNAL MEDICINE

## 2022-12-01 VITALS
SYSTOLIC BLOOD PRESSURE: 91 MMHG | WEIGHT: 130.6 LBS | HEART RATE: 70 BPM | DIASTOLIC BLOOD PRESSURE: 49 MMHG | HEIGHT: 63 IN | TEMPERATURE: 98.7 F | BODY MASS INDEX: 23.14 KG/M2 | RESPIRATION RATE: 16 BRPM | OXYGEN SATURATION: 100 %

## 2022-12-01 DIAGNOSIS — R10.84 GENERALIZED ABDOMINAL PAIN: ICD-10-CM

## 2022-12-01 DIAGNOSIS — R19.7 DIARRHEA, UNSPECIFIED TYPE: ICD-10-CM

## 2022-12-01 DIAGNOSIS — Z83.79 FAMILY HISTORY OF ULCERATIVE COLITIS: ICD-10-CM

## 2022-12-01 DIAGNOSIS — R14.0 ABDOMINAL BLOATING: ICD-10-CM

## 2022-12-01 PROCEDURE — 43239 EGD BIOPSY SINGLE/MULTIPLE: CPT | Performed by: INTERNAL MEDICINE

## 2022-12-01 PROCEDURE — 88305 TISSUE EXAM BY PATHOLOGIST: CPT

## 2022-12-01 PROCEDURE — 25010000002 PROPOFOL 10 MG/ML EMULSION: Performed by: NURSE ANESTHETIST, CERTIFIED REGISTERED

## 2022-12-01 PROCEDURE — 45380 COLONOSCOPY AND BIOPSY: CPT | Performed by: INTERNAL MEDICINE

## 2022-12-01 RX ORDER — PROPOFOL 10 MG/ML
VIAL (ML) INTRAVENOUS AS NEEDED
Status: DISCONTINUED | OUTPATIENT
Start: 2022-12-01 | End: 2022-12-01 | Stop reason: SURG

## 2022-12-01 RX ORDER — LIDOCAINE HYDROCHLORIDE 20 MG/ML
INJECTION, SOLUTION INTRAVENOUS AS NEEDED
Status: DISCONTINUED | OUTPATIENT
Start: 2022-12-01 | End: 2022-12-01 | Stop reason: SURG

## 2022-12-01 RX ADMIN — PROPOFOL 30 MG: 10 INJECTION, EMULSION INTRAVENOUS at 10:15

## 2022-12-01 RX ADMIN — PROPOFOL 120 MG: 10 INJECTION, EMULSION INTRAVENOUS at 10:08

## 2022-12-01 RX ADMIN — PROPOFOL 30 MG: 10 INJECTION, EMULSION INTRAVENOUS at 10:13

## 2022-12-01 RX ADMIN — PROPOFOL 30 MG: 10 INJECTION, EMULSION INTRAVENOUS at 10:17

## 2022-12-01 RX ADMIN — PROPOFOL 20 MG: 10 INJECTION, EMULSION INTRAVENOUS at 10:19

## 2022-12-01 RX ADMIN — DEXTROSE AND SODIUM CHLORIDE 30 ML/HR: 5; 450 INJECTION, SOLUTION INTRAVENOUS at 09:23

## 2022-12-01 RX ADMIN — LIDOCAINE HYDROCHLORIDE 60 MG: 20 INJECTION, SOLUTION INTRAVENOUS at 10:08

## 2022-12-01 RX ADMIN — PROPOFOL 30 MG: 10 INJECTION, EMULSION INTRAVENOUS at 10:10

## 2022-12-01 RX ADMIN — PROPOFOL 20 MG: 10 INJECTION, EMULSION INTRAVENOUS at 10:21

## 2022-12-01 NOTE — ANESTHESIA PREPROCEDURE EVALUATION
Anesthesia Evaluation     Patient summary reviewed and Nursing notes reviewed   history of anesthetic complications: PONV  NPO Solid Status: > 8 hours  NPO Liquid Status: > 8 hours           Airway   Mallampati: II  TM distance: >3 FB  Neck ROM: full  no difficulty expected  Dental - normal exam     Pulmonary - normal exam   Cardiovascular - negative cardio ROS and normal exam  Exercise tolerance: good (4-7 METS)    NYHA Classification: II        Neuro/Psych  (+) psychiatric history Anxiety and Depression,    GI/Hepatic/Renal/Endo    (+)   renal disease stones,   (-) GERD    ROS Comment: For laparoscopic appendectomy today for acute appendicitis. She has RLQ pain, but no nausea nor vomiting.    Musculoskeletal (-) negative ROS    Abdominal    Substance History - negative use     OB/GYN negative ob/gyn ROS   (-)  Pregnant    Comment: Has a positive HCG (469) with no intrauterine gestational sac seen on either US or MRI and she is s/p BTL during her last C/S in 2011.      Other - negative ROS                         Anesthesia Plan    ASA 2     general   total IV anesthesia  intravenous induction     Anesthetic plan, risks, benefits, and alternatives have been provided, discussed and informed consent has been obtained with: patient.

## 2022-12-01 NOTE — ANESTHESIA POSTPROCEDURE EVALUATION
Patient: Shelley Rico    Procedure Summary     Date: 12/01/22 Room / Location: [facility] ENDOSCOPY 3 / [facility] ENDOSCOPY    Anesthesia Start: 1007 Anesthesia Stop: 1024    Procedures:       ESOPHAGOGASTRODUODENOSCOPY-small bowel bx      COLONOSCOPY-look at TI with bx Diagnosis:       Diarrhea, unspecified type      Family history of ulcerative colitis      Abdominal bloating      Generalized abdominal pain      (Diarrhea, unspecified type [R19.7])      (Family history of ulcerative colitis [Z83.79])      (Abdominal bloating [R14.0])      (Generalized abdominal pain [R10.84])    Surgeons: Duy Andersen MD Provider: Chun Jordan CRNA    Anesthesia Type: general ASA Status: 2          Anesthesia Type: general    Vitals  No vitals data found for the desired time range.          Post Anesthesia Care and Evaluation    Patient location during evaluation: bedside  Patient participation: complete - patient participated  Level of consciousness: sleepy but conscious  Pain score: 0  Pain management: adequate    Airway patency: patent  Anesthetic complications: No anesthetic complications  PONV Status: none  Cardiovascular status: acceptable and stable  Respiratory status: acceptable, room air and spontaneous ventilation  Hydration status: acceptable    Comments: BP:  101/62  HR:  69  SAT:  100  RR:  18  TEMP:  98.4

## 2022-12-02 LAB — REF LAB TEST METHOD: NORMAL

## 2022-12-08 ENCOUNTER — OFFICE VISIT (OUTPATIENT)
Dept: GASTROENTEROLOGY | Facility: CLINIC | Age: 32
End: 2022-12-08

## 2022-12-08 VITALS
HEIGHT: 63 IN | BODY MASS INDEX: 23.21 KG/M2 | HEART RATE: 76 BPM | WEIGHT: 131 LBS | DIASTOLIC BLOOD PRESSURE: 70 MMHG | SYSTOLIC BLOOD PRESSURE: 112 MMHG

## 2022-12-08 DIAGNOSIS — R19.7 DIARRHEA, UNSPECIFIED TYPE: Primary | ICD-10-CM

## 2022-12-08 DIAGNOSIS — R10.84 GENERALIZED ABDOMINAL PAIN: ICD-10-CM

## 2022-12-08 DIAGNOSIS — K29.30 SUPERFICIAL GASTRITIS WITHOUT HEMORRHAGE, UNSPECIFIED CHRONICITY: ICD-10-CM

## 2022-12-08 PROCEDURE — 99214 OFFICE O/P EST MOD 30 MIN: CPT | Performed by: PHYSICIAN ASSISTANT

## 2022-12-08 RX ORDER — CHOLESTYRAMINE 4 G/9G
1 POWDER, FOR SUSPENSION ORAL 2 TIMES DAILY WITH MEALS
Qty: 60 PACKET | Refills: 3 | Status: SHIPPED | OUTPATIENT
Start: 2022-12-08

## 2022-12-08 RX ORDER — DICYCLOMINE HCL 20 MG
20 TABLET ORAL
Qty: 120 TABLET | Refills: 5 | Status: SHIPPED | OUTPATIENT
Start: 2022-12-08 | End: 2023-06-06

## 2022-12-08 NOTE — PROGRESS NOTES
[facility]      Chief Complaint:   Chief Complaint   Patient presents with   • Follow-up       Subjective     HPI:   Patient presents for follow-up after having endoscopy laboratory studies completed due to chronic diarrhea, generalized abdominal pain and acid reflux symptoms.  Patient was previously started on Bentyl 20 mg 4 times daily as needed and states she has had some relief in her diarrhea after starting this medication.  Reports occasional episodes of hot water brash for which she takes Tums.  Patient noted a family history of ulcerative colitis and colon cancer.  Patient symptoms of diarrhea started after cholecystectomy.  Currently having approximately 2 episodes of diarrhea per day, improved from previous.    EGD showed grade 1 esophagitis and gastritis.  Biopsies of the antrum show normal mucosa with reactive changes, distal esophageal biopsy shows squamous mucosa with reactive changes negative for eosinophils or malignancy, small bowel biopsy shows normal small bowel mucosa with no significant changes.  Colonoscopy showed an area of congestion in the terminal ileum with normal-appearing colon throughout.  Colonic biopsies show normal mucosa without significant pathologic change and terminal ileum biopsy shows small bowel mucosa with no significant pathologic change.  Stool studies were normal.  Food allergen panel normal.  Celiac panel shows tissue transglutaminase IgG elevated at 11 previous IgA level slightly below low normal at 76.    Assessment/plan: 1.  Gastritis- patient does not wish to take daily medication for this.  She can take Pepcid over-the-counter as needed for symptom control.  2.  Chronic diarrhea- discussed further that she could have a sensitivity to gluten so she will continue to try to remove gluten from her diet to see if this helps with her symptoms.  Also discussed that since her diarrhea started after cholecystectomy her symptoms may  be related to bile acid malabsorption.  I will prescribe cholestyramine 4 g twice daily to see if this helps with her diarrhea.  She may continue the Bentyl as previously prescribed but may decrease as needed.  She will follow-up in 3 months for recheck or contact the office sooner if she has any problems.  She will need repeat screening colonoscopy in 10 years.    Past Medical History:   Past Medical History:   Diagnosis Date   • Abdominal pain    • Alopecia    • Anxiety    • Contact dermatitis    • Disorder of gallbladder     gallstones    • Dysuria    • Gastroesophageal reflux disease    • Itch of skin    • Kidney stone    • Menometrorrhagia 2017   • Menometrorrhagia    • PONV (postoperative nausea and vomiting)    • Pruritic rash     vesicular rash      • Recurrent major depressive disorder, in partial remission (HCC) 2017   • Right upper quadrant pain    • Screening for respiratory condition    • Tubal pregnancy without intrauterine pregnancy 06/15/2017   • Tuberculosis screening    • Urinary tract infectious disease        Past Surgical History:    Past Surgical History:   Procedure Laterality Date   • BREAST AUGMENTATION  2020   •  SECTION     • CHOLECYSTECTOMY  2014    and intraoperative cholangiogram., interpreted intraoperatively under fluoroscopy   • INJECTION OF MEDICATION  2014    Depo Medrol(1)   • LAPAROSCOPIC ASSISTED VAGINAL HYSTERECTOMY N/A 10/14/2019    Procedure: TOTAL LAPAROSCOPIC HYSTERECTOMY, CYSTOSCOPY;  Surgeon: Rony Mariee DO;  Location: [facility] OR;  Service: Obstetrics/Gynecology   • LAPAROSCOPIC EXPLORATION FOR ECTOPIC PREGNANCY Bilateral 2017    Procedure: LAPAROSCOPIC EXPLORATION FOR ECTOPIC PREGNANCY;  Surgeon: Duy Jaramillo MD;  Location: [facility] OR;  Service:    • SALPINGECTOMY Bilateral 2017    Procedure: SALPINGECTOMY LAPAROSCOPIC;  Surgeon: Duy Jaramillo MD;  Location: [facility] OR;  Service:    • TONSILLECTOMY     • TUBAL  ABDOMINAL LIGATION         Family History:  Family History   Problem Relation Age of Onset   • Hypertension Father    • Cancer Maternal Grandmother    • Heart disease Maternal Grandmother    • Thyroid disease Maternal Grandmother    • Cancer Maternal Grandfather    • Heart disease Maternal Grandfather    • Cancer Paternal Grandfather        Social History:   reports that she has quit smoking. She has never used smokeless tobacco. She reports current alcohol use. She reports that she does not use drugs.    Medications:   Prior to Admission medications    Medication Sig Start Date End Date Taking? Authorizing Provider   busPIRone (BUSPAR) 10 MG tablet Take 1 tablet by mouth 2 (Two) Times a Day As Needed (anxiety). 6/15/22   Mable Villanueva APRN   cetirizine (zyrTEC) 10 MG tablet Take 10 mg by mouth 2 (Two) Times a Day.    Provider, MD Arnaldo   cholestyramine (QUESTRAN) 4 g packet Take 1 packet by mouth 2 (Two) Times a Day With Meals. 12/8/22   Lizabeth Miranda PA-C   clotrimazole-betamethasone (Lotrisone) 1-0.05 % cream Apply 1 application topically to the appropriate area as directed 2 (Two) Times a Day. 6/15/22   Mable Villanueva APRN   dicyclomine (BENTYL) 20 MG tablet Take 1 tablet by mouth 4 (Four) Times a Day Before Meals & at Bedtime As Needed (cramping) for up to 180 days. 12/8/22 6/6/23  Lizabeth Miranda PA-C   hydrOXYzine (ATARAX) 25 MG tablet Take 1 tablet by mouth 3 (Three) Times a Day As Needed for Itching, Allergies or Anxiety (sleep). 6/15/22   Mable Villanueva APRN       Allergies:  Bactrim [sulfamethoxazole-trimethoprim], Depo-medrol [methylprednisolone sodium succ], Morphine and related, Wellbutrin [bupropion], and Penicillins    ROS:    Review of Systems   Constitutional: Negative.  Negative for fever and unexpected weight change.   HENT: Negative.  Negative for trouble swallowing.    Eyes: Negative.    Respiratory: Negative.  Negative for shortness of breath.    Cardiovascular: Negative.   "Negative for chest pain.   Gastrointestinal: Positive for diarrhea. Negative for abdominal distention, abdominal pain, anal bleeding, blood in stool, constipation, nausea, rectal pain and vomiting.   Endocrine: Negative.    Genitourinary: Negative.    Skin: Negative.    Neurological: Negative.    Hematological: Negative.    Psychiatric/Behavioral: Negative.      Objective     Blood pressure 112/70, pulse 76, height 160 cm (63\"), weight 59.4 kg (131 lb), last menstrual period 10/02/2019, unknown if currently breastfeeding.    Physical Exam  Vitals and nursing note reviewed.   Constitutional:       Appearance: Normal appearance. She is normal weight.   HENT:      Head: Normocephalic and atraumatic.   Eyes:      General: No scleral icterus.  Pulmonary:      Effort: Pulmonary effort is normal.   Skin:     Coloration: Skin is not jaundiced.   Neurological:      General: No focal deficit present.      Mental Status: She is alert.   Psychiatric:         Behavior: Behavior normal.          Assessment & Plan   Diagnoses and all orders for this visit:    1. Diarrhea, unspecified type (Primary)- continue Bentyl, add Cholestyramine.     2. Generalized abdominal pain-Bentyl 20 qid    3. Superficial gastritis without hemorrhage, unspecified chronicity-prn Pepcid OTC.    Other orders  -     dicyclomine (BENTYL) 20 MG tablet; Take 1 tablet by mouth 4 (Four) Times a Day Before Meals & at Bedtime As Needed (cramping) for up to 180 days.  Dispense: 120 tablet; Refill: 5  -     cholestyramine (QUESTRAN) 4 g packet; Take 1 packet by mouth 2 (Two) Times a Day With Meals.  Dispense: 60 packet; Refill: 3        * Surgery not found *     Diagnosis Plan   1. Diarrhea, unspecified type        2. Generalized abdominal pain        3. Superficial gastritis without hemorrhage, unspecified chronicity            Anticipated Surgical Procedure:  No orders of the defined types were placed in this encounter.      The risks, benefits, and alternatives " of this procedure have been discussed with the patient or the responsible party- the patient understands and agrees to proceed.

## (undated) DEVICE — ENDOPATH XCEL BLADELESS TROCARS WITH STABILITY SLEEVES: Brand: ENDOPATH XCEL

## (undated) DEVICE — NDL FLTR 19G 1 1/2 LF

## (undated) DEVICE — CANN SMPL SOFTECH BIFLO ETCO2 A/M 7FT

## (undated) DEVICE — PK LAP CHOLE LF 60

## (undated) DEVICE — SINGLE-USE BIOPSY FORCEPS: Brand: RADIAL JAW 4

## (undated) DEVICE — INTENDED FOR TISSUE SEPARATION, AND OTHER PROCEDURES THAT REQUIRE A SHARP SURGICAL BLADE TO PUNCTURE OR CUT.: Brand: BARD-PARKER ® CARBON RIB-BACK BLADES

## (undated) DEVICE — PK LAP GYN 60

## (undated) DEVICE — SOL IRRIG NACL 1000ML

## (undated) DEVICE — GLV SURG TRIUMPH LT PF LTX 7 STRL

## (undated) DEVICE — GLV SURG TRIUMPH LT PF LTX 7.5 STRL

## (undated) DEVICE — TBG INSUFL PUR-FLO W/.1 MICRON ULPA FLTR LF

## (undated) DEVICE — GLV SURG TRIUMPH PF LTX 6.5 STRL

## (undated) DEVICE — CATH FOL LUBRISIL IC 3WY 18F 5CC

## (undated) DEVICE — HARMONIC ACE +7 LAPAROSCOPIC SHEARS ADVANCED HEMOSTASIS 5MM DIAMETER 36CM SHAFT LENGTH  FOR USE WITH GRAY HAND PIECE ONLY: Brand: HARMONIC ACE

## (undated) DEVICE — SKIN AFFIX SURG ADHESIVE 72/CS 0.55ML: Brand: MEDLINE

## (undated) DEVICE — SUTURING DEVICE: Brand: ENDO STITCH

## (undated) DEVICE — TOTAL TRAY, 16FR 10ML SIL FOLEY, URN: Brand: MEDLINE

## (undated) DEVICE — GLV SURG SENSICARE POLYISPRN W/ALOE PF LF 6 GRN STRL

## (undated) DEVICE — SUT MNCRYL 3/0 Y936H

## (undated) DEVICE — GLV SURG TRIUMPH LT PF LTX 6 STRL

## (undated) DEVICE — DEV UTERINE EPLORA1 SHRP W/VACULOK SYR 3MM

## (undated) DEVICE — SUT VICRYL O M0-4 27IN ETHCPP71D

## (undated) DEVICE — SOL IRR NACL 0.9PCT BT 1000ML

## (undated) DEVICE — GLV SURG SENSICARE ALOE LF PF SZ7.5 GRN

## (undated) DEVICE — SUT MERSILENE 5MM ETHD9212

## (undated) DEVICE — METER,URINE,400ML,DRAIN BAG,L/F,LL,SLIDE: Brand: MEDLINE

## (undated) DEVICE — ENSEAL LAPAROSCOPIC TISSUE SEALER G2 ARTICULATING  CURVED JAW FOR USE WITH G2 GENERATOR 5MM DIAMETER 35CM SHAFT LENGTH: Brand: ENSEAL

## (undated) DEVICE — GLV SURG SENSICARE GREEN W/ALOE PF LF 6 STRL

## (undated) DEVICE — GOWN,PREVENTION PLUS,XLNG/XXLARGE,STRL: Brand: MEDLINE

## (undated) DEVICE — MONOPOLAR METZENBAUM SCISSOR TIP, DISPOSABLE: Brand: MONOPOLAR METZENBAUM SCISSOR TIP, DISPOSABLE

## (undated) DEVICE — GLV SURG SENSICARE POLYISPRN W/ALOE PF LF 6.5 GRN STRL

## (undated) DEVICE — VCARE MEDIUM, UTERINE MANIPULATOR, VAGINAL-CERVICAL-AHLUWALIA'S-RETRACTOR-ELEVATOR: Brand: VCARE

## (undated) DEVICE — UNDYED BRAIDED (POLYGLACTIN 910), SYNTHETIC ABSORBABLE SUTURE: Brand: COATED VICRYL

## (undated) DEVICE — GOWN,AURORA,NOREINF,RAGLAN,XL,STERILE: Brand: MEDLINE

## (undated) DEVICE — GLV SURG SENSICARE GREEN W/ALOE PF LF 8 STRL

## (undated) DEVICE — ECHELON FLEX  POWERED VASCULAR STAPLER WITH ADVANCED PLACEMENT TIP, 35MM: Brand: ECHELON FLEX

## (undated) DEVICE — SPNG LAP 18X18IN LF STRL PK/5

## (undated) DEVICE — DECANT BG O JET

## (undated) DEVICE — NDL HYPO SFTY GLD 22G 1 1/2IN

## (undated) DEVICE — GLV SURG SENSICARE GREEN W/ALOE PF LF 6.5 STRL

## (undated) DEVICE — GLV SURG SENSICARE GREEN W/ALOE PF LF 7 STRL

## (undated) DEVICE — ANTIBACTERIAL UNDYED BRAIDED (POLYGLACTIN 910), SYNTHETIC ABSORBABLE SUTURE: Brand: COATED VICRYL

## (undated) DEVICE — DRP WARMR MACH

## (undated) DEVICE — GLV SURG SENSICARE PI LF PF 7.5 GRN STRL

## (undated) DEVICE — GLV SURG TRIUMPH LT PF LTX 6.5 STRL

## (undated) DEVICE — GLV SURG SENSICARE PI PF LF 7 GRN STRL

## (undated) DEVICE — GLV SURG TRIUMPH LT PF LTX 8 STRL

## (undated) DEVICE — BITEBLOCK ENDO W/STRAP 60F A/ LF DISP

## (undated) DEVICE — SUT VIC 0 CT1 36IN J946H

## (undated) DEVICE — APPL HEMOS FOR DELIVERY FLOSEAL

## (undated) DEVICE — GLV SURG SENSICARE PI LF PF 8 GRN STRL

## (undated) DEVICE — SYR LL TP 10ML STRL

## (undated) DEVICE — CORE TRUMPET FOR SINGLE SOLUTION BAG: Brand: CORE DYNAMICS

## (undated) DEVICE — 3M™ STERI-STRIP™ REINFORCED ADHESIVE SKIN CLOSURES, R1547, 1/2 IN X 4 IN (12 MM X 100 MM), 6 STRIPS/ENVELOPE: Brand: 3M™ STERI-STRIP™

## (undated) DEVICE — ADHS LIQ MASTISOL 2/3ML

## (undated) DEVICE — 9165 UNIVERSAL PATIENT PLATE: Brand: 3M™

## (undated) DEVICE — VISUALIZATION SYSTEM: Brand: CLEARIFY

## (undated) DEVICE — SYS CLS PORTSITE CT CLOSESURE 5AND10/12

## (undated) DEVICE — ENDOPATH XCEL DILATING TIP TROCARS WITH STABILITY SLEEVES: Brand: ENDOPATH XCEL

## (undated) DEVICE — GLV SURG SENSICARE GREEN W/ALOE PF LF 8.5 STRL

## (undated) DEVICE — SYR LL 3CC

## (undated) DEVICE — CYSTO/BLADDER IRRIGATION SET, REGULATING CLAMP